# Patient Record
Sex: MALE | Race: WHITE | NOT HISPANIC OR LATINO | Employment: OTHER | URBAN - METROPOLITAN AREA
[De-identification: names, ages, dates, MRNs, and addresses within clinical notes are randomized per-mention and may not be internally consistent; named-entity substitution may affect disease eponyms.]

---

## 2017-01-09 ENCOUNTER — PATIENT MESSAGE (OUTPATIENT)
Dept: HEPATOLOGY | Facility: CLINIC | Age: 62
End: 2017-01-09

## 2017-02-02 ENCOUNTER — PATIENT MESSAGE (OUTPATIENT)
Dept: HEPATOLOGY | Facility: CLINIC | Age: 62
End: 2017-02-02

## 2017-02-06 ENCOUNTER — PATIENT MESSAGE (OUTPATIENT)
Dept: HEPATOLOGY | Facility: CLINIC | Age: 62
End: 2017-02-06

## 2017-02-15 ENCOUNTER — PATIENT MESSAGE (OUTPATIENT)
Dept: HEPATOLOGY | Facility: CLINIC | Age: 62
End: 2017-02-15

## 2017-03-29 ENCOUNTER — PATIENT MESSAGE (OUTPATIENT)
Dept: HEPATOLOGY | Facility: CLINIC | Age: 62
End: 2017-03-29

## 2017-09-05 ENCOUNTER — PATIENT MESSAGE (OUTPATIENT)
Dept: HEPATOLOGY | Facility: CLINIC | Age: 62
End: 2017-09-05

## 2017-10-11 ENCOUNTER — TELEPHONE (OUTPATIENT)
Dept: HEPATOLOGY | Facility: CLINIC | Age: 62
End: 2017-10-11

## 2017-10-11 NOTE — TELEPHONE ENCOUNTER
Call to pto stage 2-3 fibrosis on liver biopsy in 2014. Will send information re Stellar 3 and Nina studies and my cell phone. Pt on vacation until end fo oct.

## 2017-10-30 ENCOUNTER — PATIENT MESSAGE (OUTPATIENT)
Dept: HEPATOLOGY | Facility: CLINIC | Age: 62
End: 2017-10-30

## 2017-10-30 ENCOUNTER — TELEPHONE (OUTPATIENT)
Dept: TRANSPLANT | Facility: CLINIC | Age: 62
End: 2017-10-30

## 2017-11-03 ENCOUNTER — TELEPHONE (OUTPATIENT)
Dept: HEPATOLOGY | Facility: CLINIC | Age: 62
End: 2017-11-03

## 2017-11-03 NOTE — TELEPHONE ENCOUNTER
Called pt re stellar 3. Would like to come in early dec on a Friday. Please call to schedule appt. Will resend consent- states he did not receive.

## 2017-11-06 ENCOUNTER — PATIENT MESSAGE (OUTPATIENT)
Dept: HEPATOLOGY | Facility: CLINIC | Age: 62
End: 2017-11-06

## 2017-12-28 ENCOUNTER — HOSPITAL ENCOUNTER (OUTPATIENT)
Dept: RADIOLOGY | Facility: HOSPITAL | Age: 62
Discharge: HOME | End: 2017-12-28
Attending: NURSE PRACTITIONER
Payer: COMMERCIAL

## 2017-12-28 ENCOUNTER — OFFICE VISIT (OUTPATIENT)
Dept: HEPATOLOGY | Facility: CLINIC | Age: 62
End: 2017-12-28
Payer: COMMERCIAL

## 2017-12-28 VITALS
RESPIRATION RATE: 18 BRPM | BODY MASS INDEX: 35.47 KG/M2 | WEIGHT: 291.25 LBS | HEART RATE: 78 BPM | OXYGEN SATURATION: 100 % | DIASTOLIC BLOOD PRESSURE: 95 MMHG | TEMPERATURE: 97 F | HEIGHT: 76 IN | SYSTOLIC BLOOD PRESSURE: 185 MMHG

## 2017-12-28 DIAGNOSIS — Z00.00 HEALTHCARE MAINTENANCE: ICD-10-CM

## 2017-12-28 DIAGNOSIS — K74.00 LIVER FIBROSIS: ICD-10-CM

## 2017-12-28 DIAGNOSIS — K75.81 NASH (NONALCOHOLIC STEATOHEPATITIS): ICD-10-CM

## 2017-12-28 DIAGNOSIS — D69.3 IDIOPATHIC THROMBOCYTOPENIC PURPURA: ICD-10-CM

## 2017-12-28 DIAGNOSIS — K75.81 NASH (NONALCOHOLIC STEATOHEPATITIS): Primary | ICD-10-CM

## 2017-12-28 PROCEDURE — 76700 US EXAM ABDOM COMPLETE: CPT | Mod: TC

## 2017-12-28 PROCEDURE — 99999 PR PBB SHADOW E&M-EST. PATIENT-LVL V: CPT | Mod: PBBFAC,,, | Performed by: NURSE PRACTITIONER

## 2017-12-28 PROCEDURE — 76700 US EXAM ABDOM COMPLETE: CPT | Mod: 26,,, | Performed by: RADIOLOGY

## 2017-12-28 PROCEDURE — 99214 OFFICE O/P EST MOD 30 MIN: CPT | Mod: S$GLB,,, | Performed by: NURSE PRACTITIONER

## 2017-12-28 RX ORDER — CYANOCOBALAMIN (VITAMIN B-12) 500 MCG
1 TABLET ORAL NIGHTLY
COMMUNITY
End: 2018-12-06

## 2017-12-28 RX ORDER — COLCHICINE 0.6 MG/1
0.6 TABLET ORAL DAILY PRN
COMMUNITY
Start: 2017-11-22 | End: 2019-06-04

## 2017-12-28 NOTE — PROGRESS NOTES
[facility] Established Patient Visit    Reason for Visit:  F/u SHEPPARD    PCP: Edgar Clements    HPI:  This is a 62 y.o. male here for f/u of SHEPPARD. His liver staging has suggested at least moderate fibrosis.    Liver staging history:  - liver biopsy 6/2010 - severe steatohepatitis at 80% w/ stage 1 fibrosis.   - Fibrosure 2014 - cirrhosis, F4   - repeat liver biopsy 7/25/14 - mild steatohepatitis with stage 2-3 out of 4 fibrosis  - Fibroscan 12/2016 - F2/F3, kPa of 10.0     He had labs and u/s today for monitoring. We have recommended labs Q 6 months and u/s yearly. He has stably mildly elevated transaminases, ALT > AST, and mild Tbili elevation at 1.0-1.4. Normal alb and INR. He has a history of ITP, followed by Dr. Marley in the past. Plt count has been stable. Recommendations were to f/u with hematology again if plts < 40. U/s today shows hepatomegaly with steatosis but with some improvement in degree of steatosis. No masses. Spleen 11.7 cm. He has gained about 15 lbs since last year. He is not following healthy diet or exercising.     He reports he feels well and denies any symptoms of advanced chronic liver disease including jaundice, dark urine, hematemesis, melena, slowed mentation, abdominal distention. He is taking vitamin E 800 units daily. He was considering participating in SHEPPARD fibrosis study but has declined because he has reservations about the placebo arm and frequency of visits required since he lives 1.5 hrs away and works a lot.      He requests for me to check his PSA with next labs since he only pays a copay when he does lab at [facility] and with his PCP it costs him several hundred dollars. Will also do lipid panel and Hgb A1C with next lab.       ROS:   GENERAL: Denies fever, chills, weight loss/gain, (+) fatigue  HEENT: Denies headaches, dizziness, vision/hearing changes  CARDIOVASCULAR: Denies chest pain, palpitations, or edema  RESPIRATORY: Denies dyspnea, cough  GI: (+) RUQ abdominal  "pain, no rectal bleeding, nausea, vomiting. No change in bowel pattern or color  : Denies dysuria, hematuria   SKIN: Denies rash, itching   NEURO: Denies confusion, memory loss, or mood changes  PSYCH: Denies depression or anxiety  HEME/LYMPH: Denies easy bruising or bleeding      PMHX:  has a past medical history of HLD (hyperlipidemia); Hypertension; ITP (idiopathic thrombocytopenic purpura); Liver fibrosis (12/19/2016); and SHEPPARD (nonalcoholic steatohepatitis).    PSHX:  has a past surgical history that includes Bone marrow biopsy (8/6/10); Liver biopsy (6/11/10); and Liver biopsy (7/2014).    The patient's social and family histories were reviewed by me and updated in the appropriate section of the electronic medical record.    Review of patient's allergies indicates:   Allergen Reactions    Niacin preparations      Flushed, increased HR       Current Outpatient Rx   Name  Route  Sig  Dispense  Refill    atenolol (TENORMIN) 50 MG tablet    Oral    Take 1 tablet by mouth Daily.              cyanocobalamin (VITAMIN B-12) 1000 MCG tablet    Oral    Take 100 mcg by mouth once daily.              losartan (COZAAR) 100 MG tablet    Oral    Take 100 mg by mouth once daily.              multivitamin (MULTIVITAMIN) per tablet    Oral    Take by mouth Daily.              omega-3 acid ethyl esters (LOVAZA) 1 gram capsule    Oral    Take 2 capsules by mouth Daily.              vitamin E 400 UNIT capsule    Oral    Take 800 Units by mouth once daily.                   Objective Findings:    PHYSICAL EXAM:   Friendly White male, in no acute distress; alert and oriented to person, place and time  VITALS:   BP (!) 185/95 (BP Location: Right arm, Patient Position: Sitting, BP Method: Medium (Automatic))   Pulse 78   Temp 96.5 °F (35.8 °C) (Oral)   Resp 18   Ht 6' 4" (1.93 m)   Wt 132.1 kg (291 lb 3.6 oz)   SpO2 100%   BMI 35.45 kg/m²    HENT: Normocephalic, without obvious abnormality. Oral mucosa pink and moist. " Dentition good.  EYES: Sclerae anicteric.   NECK: Supple.   CARDIOVASCULAR: Regular rate and rhythm. No murmurs.  RESPIRATORY: Normal respiratory effort. BBS CTA. No wheezes or crackles.  GI: Obese, soft, non-tender. Liver edge palpable. No masses palpable. No ascites.  EXTREMITIES:  No clubbing, cyanosis or edema.  SKIN: Warm and dry. No jaundice. No rashes noted to exposed skin. No telangectasias noted. No palmar erythema.  NEURO:  Normal gate. No asterixis.  PSYCH:  Memory intact. Thought and speech pattern appropriate. Behavior normal. No depression or anxiety noted.      Labs:  Lab Results   Component Value Date    WBC 9.13 12/28/2017    HGB 16.1 12/28/2017    HCT 46.9 12/28/2017     (L) 12/28/2017    CHOL 199 01/02/2014    TRIG 227 (H) 01/02/2014    HDL 29 (L) 01/02/2014     12/28/2017    K 4.0 12/28/2017    CREATININE 1.2 12/28/2017    ALT 93 (H) 12/28/2017    AST 63 (H) 12/28/2017    ALKPHOS 57 12/28/2017    BILITOT 1.3 (H) 12/28/2017    ALBUMIN 3.9 12/28/2017    INR 1.0 12/28/2017    AFP 2.1 12/28/2017     ABD U/S 12/28/17  Findings:    Pancreas:  Visualization of the pancreas is obscured due to obscuration by overlying bowel gas.     Liver:  Enlargedwith the right hepatic lobe spanning approximately 21.0 cm and extending below the costal margin. The liver demonstrates homogenously hyperechoic echotexture with an elevated hepatorenal index of 1.8 suggesting greater than 5% hepatic steatosis.  Hepatorenal index has decreased from 2.09 previously suggesting improving hepatic steatosis.  There is a region of fatty sparing adjacent to the gallbladder fossa measuring approximately 1.3 cm.      Gallbladder:  Normal in appearance without cholelithiasis, gallbladder wall thickening, or pericholecystic fluid. Sonographic Majano's signs is negative. The common duct is not dilated, measuring 4.0 mm. No dilated intrahepatic radicles.     Spleen:  At the upper limits of normal in size measuring 11.7 x 4.2  cm with a homogeneous echotexture.    Aorta:  No aneurysm.      Inferior vena cava:  Normal in appearance.    Right kidney:  Measures 11.8cm. No nephrolithiasis or hydronephrosis.    Left kidney:  Measures 10.7cm. There is a stable cyst in the midpole measuring 1.2 x 1.0 x 1.0 cm.      Miscellaneous:  No abdominal ascites.   Impression         1.  Hepatomegaly.    2. Decrease in the hepatorenal index from 2.09 to 1.8 on today's examination suggesting improving hepatic steatosis (although still abnormal)    2.  Left renal cyst.       ASSESSMENT:  62 y.o. White male with:  1.  SHEPPARD   -- transaminases elevated, ALT > AST  -- US- hepatomegaly with fatty infiltration, no splenomegaly   -- synthetic liver function normal, Tbili minimally elevated with nl alb and INR  -- risk factors for fatty liver include obese with BMI 34, HTN, hypertriglyceridemia   -- s/p hepatitis A and B vaccines done with PCP  2. Liver fibrosis  -- liver biopsy 6/2010 - severe steatohepatitis at 80% w/ stage 1 fibrosis.   -- Fibrosure 2014 - cirrhosis, F4   -- repeat liver biopsy 7/25/14 - mild steatohepatitis with stage 2-3 out of 4 fibrosis  -- Fibroscan 12/2016 - F2/F3, kPa of 10.0.   3. ITP, stable   4. Healthcare maintenance  -- check PSA, lipids, hgb A1C with next labs      EDUCATION:   We discussed the manifestations of NAFLD. At this time there is no FDA approved therapy for NAFLD.   The patient and I discussed the importance of diet, exercise, and weight loss for management of NAFLD. We discussed a low fat, low carb/low sugar, high fiber diet, and a goal of 30 minutes of exercise 5 times per week.   Pt is aware that fatty liver can progress to steatohepatitis and possibly to cirrhosis, putting one at increased risk for liver cancer, liver failure, and death.  Discussed weight loss will help to minimize progression of liver disease. May possibly progress to cirrhosis in the future so we will continue to monitor him for this with labs Q 6  months and u/s yearly.      PLAN:  1. Referral to bariatric medicine to help with weight loss  2. Continue Vitamin E 800 IU daily  3. Weight loss goal of 15-20 lbs  4. Low fat, low cholesterol, low carb, high fiber diet  5. Exercise, 5 days a week, 30 min a day  6. Recommend good control of cholesterol  7. Labs for CBC, CMP, INR, lipid panel, Hgb A1C, PSA in 6/2018 at [place]  8. Follow up in 12/2018 with Fibroscan same day and lab for CMP, CBC, AFP, and INR and abd u/s to be done at [place] before appt    Thank you for allowing me to participate in the care of Danny LinderNick Hodges, TRISHC

## 2018-02-08 ENCOUNTER — TELEPHONE (OUTPATIENT)
Dept: HEPATOLOGY | Facility: CLINIC | Age: 63
End: 2018-02-08

## 2018-02-08 ENCOUNTER — PATIENT MESSAGE (OUTPATIENT)
Dept: HEPATOLOGY | Facility: CLINIC | Age: 63
End: 2018-02-08

## 2018-02-08 NOTE — TELEPHONE ENCOUNTER
Contacted patient re Stellar 3 trial. Patient is interested but would like to look over the consent form this weekend. Consent form emailed. I will touch base with him again next Wednesday after the holiday. Emphasized the time sensitive nature of the trial closing to enrollment soon. Patient stated understanding.

## 2018-04-16 ENCOUNTER — PATIENT MESSAGE (OUTPATIENT)
Dept: HEPATOLOGY | Facility: CLINIC | Age: 63
End: 2018-04-16

## 2018-06-29 ENCOUNTER — LAB VISIT (OUTPATIENT)
Dept: LAB | Facility: HOSPITAL | Age: 63
End: 2018-06-29
Attending: NURSE PRACTITIONER
Payer: COMMERCIAL

## 2018-06-29 DIAGNOSIS — Z00.00 HEALTHCARE MAINTENANCE: ICD-10-CM

## 2018-06-29 DIAGNOSIS — K74.00 LIVER FIBROSIS: ICD-10-CM

## 2018-06-29 DIAGNOSIS — K75.81 NASH (NONALCOHOLIC STEATOHEPATITIS): ICD-10-CM

## 2018-06-29 DIAGNOSIS — K74.60 HEPATIC CIRRHOSIS, UNSPECIFIED HEPATIC CIRRHOSIS TYPE: ICD-10-CM

## 2018-06-29 LAB
ALBUMIN SERPL BCP-MCNC: 4 G/DL
ALP SERPL-CCNC: 52 U/L
ALT SERPL W/O P-5'-P-CCNC: 100 U/L
ANION GAP SERPL CALC-SCNC: 9 MMOL/L
AST SERPL-CCNC: 68 U/L
BASOPHILS # BLD AUTO: 0.09 K/UL
BASOPHILS NFR BLD: 1.1 %
BILIRUB SERPL-MCNC: 1 MG/DL
BUN SERPL-MCNC: 16 MG/DL
CALCIUM SERPL-MCNC: 10.2 MG/DL
CHLORIDE SERPL-SCNC: 108 MMOL/L
CHOLEST SERPL-MCNC: 194 MG/DL
CHOLEST/HDLC SERPL: 6.7 {RATIO}
CO2 SERPL-SCNC: 27 MMOL/L
COMPLEXED PSA SERPL-MCNC: 2.8 NG/ML
CREAT SERPL-MCNC: 1.2 MG/DL
DIFFERENTIAL METHOD: ABNORMAL
EOSINOPHIL # BLD AUTO: 0.2 K/UL
EOSINOPHIL NFR BLD: 2.5 %
ERYTHROCYTE [DISTWIDTH] IN BLOOD BY AUTOMATED COUNT: 13.2 %
EST. GFR  (AFRICAN AMERICAN): >60 ML/MIN/1.73 M^2
EST. GFR  (NON AFRICAN AMERICAN): >60 ML/MIN/1.73 M^2
ESTIMATED AVG GLUCOSE: 108 MG/DL
GLUCOSE SERPL-MCNC: 116 MG/DL
HBA1C MFR BLD HPLC: 5.4 %
HCT VFR BLD AUTO: 46.9 %
HDLC SERPL-MCNC: 29 MG/DL
HDLC SERPL: 14.9 %
HGB BLD-MCNC: 15.9 G/DL
INR PPP: 1.1
LDLC SERPL CALC-MCNC: 130 MG/DL
LYMPHOCYTES # BLD AUTO: 2.8 K/UL
LYMPHOCYTES NFR BLD: 33.2 %
MCH RBC QN AUTO: 33.1 PG
MCHC RBC AUTO-ENTMCNC: 33.9 G/DL
MCV RBC AUTO: 98 FL
MONOCYTES # BLD AUTO: 1 K/UL
MONOCYTES NFR BLD: 11.4 %
NEUTROPHILS # BLD AUTO: 4.4 K/UL
NEUTROPHILS NFR BLD: 51.8 %
NONHDLC SERPL-MCNC: 165 MG/DL
PLATELET # BLD AUTO: 96 K/UL
PMV BLD AUTO: 10.5 FL
POTASSIUM SERPL-SCNC: 5 MMOL/L
PROT SERPL-MCNC: 8 G/DL
PROTHROMBIN TIME: 11.1 SEC
RBC # BLD AUTO: 4.81 M/UL
SODIUM SERPL-SCNC: 144 MMOL/L
TRIGL SERPL-MCNC: 175 MG/DL
WBC # BLD AUTO: 8.56 K/UL

## 2018-06-29 PROCEDURE — 36415 COLL VENOUS BLD VENIPUNCTURE: CPT

## 2018-06-29 PROCEDURE — 84153 ASSAY OF PSA TOTAL: CPT

## 2018-06-29 PROCEDURE — 80061 LIPID PANEL: CPT

## 2018-06-29 PROCEDURE — 83036 HEMOGLOBIN GLYCOSYLATED A1C: CPT

## 2018-06-29 PROCEDURE — 85610 PROTHROMBIN TIME: CPT

## 2018-06-29 PROCEDURE — 85025 COMPLETE CBC W/AUTO DIFF WBC: CPT

## 2018-06-29 PROCEDURE — 80053 COMPREHEN METABOLIC PANEL: CPT

## 2018-07-02 ENCOUNTER — PATIENT MESSAGE (OUTPATIENT)
Dept: HEPATOLOGY | Facility: CLINIC | Age: 63
End: 2018-07-02

## 2018-08-08 ENCOUNTER — PATIENT MESSAGE (OUTPATIENT)
Dept: HEPATOLOGY | Facility: CLINIC | Age: 63
End: 2018-08-08

## 2018-08-11 ENCOUNTER — PATIENT MESSAGE (OUTPATIENT)
Dept: HEPATOLOGY | Facility: CLINIC | Age: 63
End: 2018-08-11

## 2018-10-13 ENCOUNTER — PATIENT MESSAGE (OUTPATIENT)
Dept: HEPATOLOGY | Facility: CLINIC | Age: 63
End: 2018-10-13

## 2018-11-30 ENCOUNTER — HOSPITAL ENCOUNTER (OUTPATIENT)
Dept: RADIOLOGY | Facility: HOSPITAL | Age: 63
Discharge: HOME | End: 2018-11-30
Attending: NURSE PRACTITIONER
Payer: COMMERCIAL

## 2018-11-30 DIAGNOSIS — K75.81 NASH (NONALCOHOLIC STEATOHEPATITIS): ICD-10-CM

## 2018-11-30 DIAGNOSIS — K74.00 LIVER FIBROSIS: ICD-10-CM

## 2018-11-30 PROCEDURE — 76700 US EXAM ABDOM COMPLETE: CPT | Mod: TC

## 2018-11-30 PROCEDURE — 76700 US EXAM ABDOM COMPLETE: CPT | Mod: 26,,, | Performed by: RADIOLOGY

## 2018-12-06 ENCOUNTER — OFFICE VISIT (OUTPATIENT)
Dept: HEPATOLOGY | Facility: CLINIC | Age: 63
End: 2018-12-06
Payer: COMMERCIAL

## 2018-12-06 ENCOUNTER — PROCEDURE VISIT (OUTPATIENT)
Dept: HEPATOLOGY | Facility: CLINIC | Age: 63
End: 2018-12-06
Attending: NURSE PRACTITIONER
Payer: COMMERCIAL

## 2018-12-06 VITALS
OXYGEN SATURATION: 100 % | HEIGHT: 76 IN | HEART RATE: 75 BPM | SYSTOLIC BLOOD PRESSURE: 175 MMHG | RESPIRATION RATE: 18 BRPM | BODY MASS INDEX: 35.17 KG/M2 | WEIGHT: 288.81 LBS | DIASTOLIC BLOOD PRESSURE: 87 MMHG

## 2018-12-06 DIAGNOSIS — K74.00 LIVER FIBROSIS: ICD-10-CM

## 2018-12-06 DIAGNOSIS — Z12.11 COLON CANCER SCREENING: ICD-10-CM

## 2018-12-06 DIAGNOSIS — D69.3 IDIOPATHIC THROMBOCYTOPENIC PURPURA: ICD-10-CM

## 2018-12-06 DIAGNOSIS — E66.09 CLASS 2 OBESITY DUE TO EXCESS CALORIES WITHOUT SERIOUS COMORBIDITY WITH BODY MASS INDEX (BMI) OF 35.0 TO 35.9 IN ADULT: ICD-10-CM

## 2018-12-06 DIAGNOSIS — I10 HYPERTENSION, UNSPECIFIED TYPE: ICD-10-CM

## 2018-12-06 DIAGNOSIS — K75.81 NASH (NONALCOHOLIC STEATOHEPATITIS): ICD-10-CM

## 2018-12-06 DIAGNOSIS — K75.81 NASH (NONALCOHOLIC STEATOHEPATITIS): Primary | ICD-10-CM

## 2018-12-06 DIAGNOSIS — K74.60 CIRRHOSIS OF LIVER WITHOUT ASCITES, UNSPECIFIED HEPATIC CIRRHOSIS TYPE: ICD-10-CM

## 2018-12-06 PROBLEM — E66.812 CLASS 2 OBESITY DUE TO EXCESS CALORIES WITHOUT SERIOUS COMORBIDITY WITH BODY MASS INDEX (BMI) OF 35.0 TO 35.9 IN ADULT: Status: ACTIVE | Noted: 2018-12-06

## 2018-12-06 PROCEDURE — 3008F BODY MASS INDEX DOCD: CPT | Mod: CPTII,S$GLB,, | Performed by: NURSE PRACTITIONER

## 2018-12-06 PROCEDURE — 99999 PR PBB SHADOW E&M-EST. PATIENT-LVL IV: CPT | Mod: PBBFAC,,, | Performed by: NURSE PRACTITIONER

## 2018-12-06 PROCEDURE — 3077F SYST BP >= 140 MM HG: CPT | Mod: CPTII,S$GLB,, | Performed by: NURSE PRACTITIONER

## 2018-12-06 PROCEDURE — 99214 OFFICE O/P EST MOD 30 MIN: CPT | Mod: S$GLB,,, | Performed by: NURSE PRACTITIONER

## 2018-12-06 PROCEDURE — 91200 LIVER ELASTOGRAPHY: CPT | Mod: S$GLB,,, | Performed by: NURSE PRACTITIONER

## 2018-12-06 PROCEDURE — 3079F DIAST BP 80-89 MM HG: CPT | Mod: CPTII,S$GLB,, | Performed by: NURSE PRACTITIONER

## 2018-12-06 NOTE — PROGRESS NOTES
[facility] Established Patient Visit    Reason for Visit:  F/u SHEPPARD    PCP: Edgar Clements    HPI:  This is a 63 y.o. male here for f/u of SHEPPARD. His liver staging has suggested at least moderate fibrosis.    Liver staging history:  - liver biopsy 6/2010 - severe steatohepatitis at 80% w/ stage 1 fibrosis.   - Fibrosure 2014 - cirrhosis, F4   - repeat liver biopsy 7/25/14 - mild steatohepatitis with stage 2-3 out of 4 fibrosis  - Fibroscan 12/2016 - F2/F3, kPa of 10.0  - Fibroscan today - kPa 14.6, F4 cirrhosis, CAP = 341, S3     He had labs and u/s recently for monitoring. He has stably mildly elevated transaminases, ALT > AST, and mild Tbili elevation at 1.0-1.4. Normal alb and INR. He has a history of ITP, followed by Dr. Marley in the past. Plt count has been stable. Recommendations were to f/u with hematology again if plts < 40. U/s shows hepatomegaly with steatosis. No masses. Spleen 10.8 cm. He has lost 3 lbs since last year. He is trying to follow healthy diet. He did not see bariatric medicine because it wasn't covered by insurance.     He reports he feels well and denies any symptoms of advanced chronic liver disease including jaundice, dark urine, hematemesis, melena, slowed mentation, abdominal distention. He is taking vitamin E 800 units daily. He was considering participating in SHEPPARD fibrosis study but has declined in past. Will not reconsider since Fibroscan today now shows cirrhosis.        ROS:   GENERAL: Denies fever, chills, weight loss/gain, fatigue  HEENT: Denies headaches, dizziness, vision/hearing changes  CARDIOVASCULAR: Denies chest pain, palpitations, or edema  RESPIRATORY: Denies dyspnea, cough  GI: Denies abdominal pain, rectal bleeding, nausea, vomiting. No change in bowel pattern or color  : Denies dysuria, hematuria   SKIN: Denies rash, itching   NEURO: Denies confusion, memory loss, or mood changes  PSYCH: Denies depression or anxiety  HEME/LYMPH: Denies easy bruising or  "bleeding      PMHX:  has a past medical history of HLD (hyperlipidemia), Hypertension, ITP (idiopathic thrombocytopenic purpura), Liver fibrosis (12/19/2016), and SHEPPARD (nonalcoholic steatohepatitis).    PSHX:  has a past surgical history that includes Bone marrow biopsy (8/6/10); Liver biopsy (6/11/10); Liver biopsy (7/2014); and BIOPSY LIVER AND ULTRASOUND (N/A, 7/25/2014).    The patient's social and family histories were reviewed by me and updated in the appropriate section of the electronic medical record.    Review of patient's allergies indicates:   Allergen Reactions    Niacin preparations      Flushed, increased HR       Current Outpatient Medications on File Prior to Visit   Medication Sig Dispense Refill    atenolol (TENORMIN) 50 MG tablet Take 1 tablet by mouth Daily.      colchicine 0.6 mg tablet Take 0.6 mg by mouth daily as needed.       losartan (COZAAR) 100 MG tablet Take 100 mg by mouth once daily.      milk thistle 175 mg tablet Take 175 mg by mouth 2 (two) times daily.      multivitamin (MULTIVITAMIN) per tablet Take by mouth Daily.      omega-3 acid ethyl esters (LOVAZA) 1 gram capsule Take 2 capsules by mouth Daily.      vitamin E 400 UNIT capsule Take 800 Units by mouth once daily.      [DISCONTINUED] melatonin 1 mg Tab Take 1 mg by mouth every evening.       No current facility-administered medications on file prior to visit.         Objective Findings:    PHYSICAL EXAM:   Friendly White male, in no acute distress; alert and oriented to person, place and time  VITALS:   BP (!) 175/87 (BP Location: Left arm, Patient Position: Sitting)   Pulse 75   Resp 18   Ht 6' 4" (1.93 m)   Wt 131 kg (288 lb 12.8 oz)   SpO2 100%   BMI 35.15 kg/m²    HENT: Normocephalic, without obvious abnormality. Oral mucosa pink and moist. Dentition good.  EYES: Sclerae anicteric.   NECK: Supple.   CARDIOVASCULAR: Regular rate and rhythm. No murmurs.  RESPIRATORY: Normal respiratory effort. BBS CTA. No " wheezes or crackles.  GI: Obese, soft, non-tender. Liver edge palpable. No masses palpable. No ascites.  EXTREMITIES:  No clubbing, cyanosis or edema.  SKIN: Warm and dry. No jaundice. No rashes noted to exposed skin. No telangectasias noted. No palmar erythema.  NEURO:  Normal gate. No asterixis.  PSYCH:  Memory intact. Thought and speech pattern appropriate. Behavior normal. No depression or anxiety noted.      Labs:  Lab Results   Component Value Date    WBC 8.25 11/30/2018    HGB 16.0 11/30/2018    HCT 47.0 11/30/2018    PLT 91 (L) 11/30/2018    CHOL 194 06/29/2018    TRIG 175 (H) 06/29/2018    HDL 29 (L) 06/29/2018     11/30/2018    K 4.7 11/30/2018    CREATININE 1.2 11/30/2018    ALT 57 (H) 11/30/2018    AST 37 11/30/2018    ALKPHOS 46 (L) 11/30/2018    BILITOT 1.2 (H) 11/30/2018    ALBUMIN 4.0 11/30/2018    INR 1.1 11/30/2018    AFP 2.0 11/30/2018     ABD U/S 11/30/18  FINDINGS:  Pancreas: The visualized portions of pancreas appear normal.    Aorta: No aneurysm.    Liver: 19.7 cm, normal in size. Fatty liver infiltration.  No focal lesions.    Gallbladder: No calculi, wall thickening, or pericholecystic fluid.  Negative sonographic Majano's sign.    Biliary system: 5.5 mm common bile duct.  No intrahepatic ductal dilatation.    Inferior vena cava: Normal in appearance.    Right kidney: 11.5 cm. Midpole cyst measures 1.4 cm.    Left kidney: 11.6 cm. Two small cysts are noted, the largest measuring 1.2 cm.    Spleen: 10.8 cm.  Normal in size with homogeneous echotexture.    Miscellaneous: No ascites.      Impression       Hepatomegaly with Fatty infiltration of the liver.    Bilateral renal cysts.       ASSESSMENT:  63 y.o. White male with:  1.  SHEPPARD   -- transaminases elevated, ALT > AST  -- US- hepatomegaly with fatty infiltration, no splenomegaly   -- synthetic liver function normal, Tbili minimally elevated with nl alb and INR  -- risk factors for fatty liver include obese with BMI 35, HTN,  hypertriglyceridemia   2. Cirrhosis, based on Fibroscan = F4 today, well compensated  -- MELD = MELD-Na score: 10 at 11/30/2018  9:17 AM  MELD score: 10 at 11/30/2018  9:17 AM  Calculated from:  Serum Creatinine: 1.2 mg/dL at 11/30/2018  9:17 AM  Serum Sodium: 142 mmol/L (Rounded to 137 mmol/L) at 11/30/2018  9:17 AM  Total Bilirubin: 1.2 mg/dL at 11/30/2018  9:17 AM  INR(ratio): 1.1 at 11/30/2018  9:17 AM  Age: 63 years  -- HCC screening- AFP and abd. U/S - up to date, next due 6/2019  -- Immunity to Hep A and B - s/p hepatitis A and B vaccines done with PCP  -- EGD - none  -- liver biopsy 6/2010 - severe steatohepatitis at 80% w/ stage 1 fibrosis.   -- Fibrosure 2014 - cirrhosis, F4   -- repeat liver biopsy 7/25/14 - mild steatohepatitis with stage 2-3 out of 4 fibrosis  -- Fibroscan 12/2016 - F2/F3, kPa of 10.0  -- Fibroscan today - kPa 14.6, F4 cirrhosis, CAP = 341, S3    3. ITP, stable   4. Colon cancer screening  -- has never had colonoscopy. Since he will need EGD for varices screening, will do colonoscopy as well    EDUCATION:   We discussed the manifestations of NAFLD. At this time there is no FDA approved therapy for NAFLD.   The patient and I discussed the importance of diet, exercise, and weight loss for management of NAFLD. We discussed a low fat, low carb/low sugar, high fiber diet, and a goal of 30 minutes of exercise 5 times per week.   Pt is aware that fatty liver can progress to steatohepatitis and possibly to cirrhosis, putting one at increased risk for liver cancer, liver failure, and death.  Discussed weight loss will help to minimize progression of liver disease.     The disease process and manifestations of cirrhosis were discussed.    Signs and symptoms of hepatic decompensation were reviewed, including jaundice, ascites, and slowed mentation due to hepatic encephalopathy. The patient should seek medical attention if any of these things occur.  We discussed the potential for bleeding from  esophageal varices with symptoms of hematemesis and melena. The patient should report to the Emergency Department for these symptoms.    We discussed the increased risk of hepatocellular carcinoma due to cirrhosis. Continued screening every six months with ultrasound and AFP is recommended.     No alcohol or raw seafood.  Tylenol/acetaminophen as needed for pain, up to 2000 mg daily    Discussed implications of a cirrhosis diagnosis with HCC screenings and EGD and why it is important for us to determine if pt's have cirrhosis so they can be properly monitored for potential complications.         PLAN:  1. HCC screening Q 6 months with AFP and abd. U/S, next due 6/2019  2. Continue Vitamin E 800 IU daily  3. Weight loss goal of 15-20 lbs  4. Low fat, low cholesterol, low carb, high fiber diet  5. Exercise, 5 days a week, 30 min a day  6. Recommend good control of cholesterol  7. Tylenol/acetaminophen as needed for pain, up to 2000 mg daily. No NSAIDS  8. No alcohol or raw seafood  9. Discussed SHEPPARD fibrosis/cirrhosis clinical trials that are currently enrolling. He is interested in this again since he now has cirrhosis. Will re-submit him  10. Follow up in 6/2019 with lab for CMP, CBC, AFP, and INR and abd u/s to be done at [place] before appt    Thank you for allowing me to participate in the care of ARGENTINA Lewis

## 2018-12-06 NOTE — Clinical Note
Please enter recall for Follow up in 6/2019 with lab for CMP, CBC, AFP, and INR and abd u/s to be done at [place] before appt

## 2018-12-06 NOTE — PROCEDURES
Fibroscan Procedure     Name: Danny Jack  Date of Procedure : 2018   :: Winsome Hodges NP  Diagnosis: NAFLD    Probe: XL    Fibroscan readin.6 KPa    Fibrosis:F4     CAP readin dB/m    Steatosis: :S3

## 2018-12-11 DIAGNOSIS — Z12.11 SPECIAL SCREENING FOR MALIGNANT NEOPLASMS, COLON: Primary | ICD-10-CM

## 2018-12-11 DIAGNOSIS — K74.60 HEPATIC CIRRHOSIS, UNSPECIFIED HEPATIC CIRRHOSIS TYPE, UNSPECIFIED WHETHER ASCITES PRESENT: Primary | ICD-10-CM

## 2018-12-11 RX ORDER — POLYETHYLENE GLYCOL 3350, SODIUM SULFATE ANHYDROUS, SODIUM BICARBONATE, SODIUM CHLORIDE, POTASSIUM CHLORIDE 236; 22.74; 6.74; 5.86; 2.97 G/4L; G/4L; G/4L; G/4L; G/4L
4 POWDER, FOR SOLUTION ORAL ONCE
Qty: 4000 ML | Refills: 0 | Status: SHIPPED | OUTPATIENT
Start: 2018-12-11 | End: 2018-12-11

## 2019-01-11 ENCOUNTER — PATIENT MESSAGE (OUTPATIENT)
Dept: ADMINISTRATIVE | Facility: OTHER | Age: 64
End: 2019-01-11

## 2019-03-27 ENCOUNTER — PATIENT MESSAGE (OUTPATIENT)
Dept: HEPATOLOGY | Facility: CLINIC | Age: 64
End: 2019-03-27

## 2019-04-17 ENCOUNTER — TELEPHONE (OUTPATIENT)
Dept: HEPATOLOGY | Facility: CLINIC | Age: 64
End: 2019-04-17

## 2019-04-17 DIAGNOSIS — K74.60 HEPATIC CIRRHOSIS, UNSPECIFIED HEPATIC CIRRHOSIS TYPE: Primary | ICD-10-CM

## 2019-04-17 NOTE — TELEPHONE ENCOUNTER
Verified appointment scheduling with patient, approved of appointments that I set for him for June at [facility] and @ [facility]. Mailed appointment verifications to home address

## 2019-05-28 ENCOUNTER — PATIENT MESSAGE (OUTPATIENT)
Dept: HEPATOLOGY | Facility: CLINIC | Age: 64
End: 2019-05-28

## 2019-05-28 DIAGNOSIS — Z00.00 HEALTHCARE MAINTENANCE: Primary | ICD-10-CM

## 2019-06-04 ENCOUNTER — OFFICE VISIT (OUTPATIENT)
Dept: HEPATOLOGY | Facility: CLINIC | Age: 64
End: 2019-06-04
Payer: MEDICARE

## 2019-06-04 ENCOUNTER — HOSPITAL ENCOUNTER (OUTPATIENT)
Dept: RADIOLOGY | Facility: HOSPITAL | Age: 64
Discharge: HOME | End: 2019-06-04
Attending: NURSE PRACTITIONER
Payer: MEDICARE

## 2019-06-04 ENCOUNTER — PATIENT MESSAGE (OUTPATIENT)
Dept: HEPATOLOGY | Facility: CLINIC | Age: 64
End: 2019-06-04

## 2019-06-04 VITALS
BODY MASS INDEX: 34.1 KG/M2 | HEIGHT: 76 IN | HEART RATE: 77 BPM | SYSTOLIC BLOOD PRESSURE: 167 MMHG | RESPIRATION RATE: 18 BRPM | OXYGEN SATURATION: 99 % | DIASTOLIC BLOOD PRESSURE: 108 MMHG | WEIGHT: 280 LBS

## 2019-06-04 DIAGNOSIS — E66.09 CLASS 2 OBESITY DUE TO EXCESS CALORIES WITHOUT SERIOUS COMORBIDITY WITH BODY MASS INDEX (BMI) OF 35.0 TO 35.9 IN ADULT: ICD-10-CM

## 2019-06-04 DIAGNOSIS — K75.81 NASH (NONALCOHOLIC STEATOHEPATITIS): ICD-10-CM

## 2019-06-04 DIAGNOSIS — D69.3 IDIOPATHIC THROMBOCYTOPENIC PURPURA: ICD-10-CM

## 2019-06-04 DIAGNOSIS — I10 HYPERTENSION, UNSPECIFIED TYPE: ICD-10-CM

## 2019-06-04 DIAGNOSIS — K74.60 CIRRHOSIS OF LIVER WITHOUT ASCITES, UNSPECIFIED HEPATIC CIRRHOSIS TYPE: ICD-10-CM

## 2019-06-04 DIAGNOSIS — Z12.11 COLON CANCER SCREENING: ICD-10-CM

## 2019-06-04 DIAGNOSIS — K74.60 HEPATIC CIRRHOSIS, UNSPECIFIED HEPATIC CIRRHOSIS TYPE: Primary | ICD-10-CM

## 2019-06-04 DIAGNOSIS — K74.60 HEPATIC CIRRHOSIS, UNSPECIFIED HEPATIC CIRRHOSIS TYPE: ICD-10-CM

## 2019-06-04 PROCEDURE — 99999 PR PBB SHADOW E&M-EST. PATIENT-LVL IV: ICD-10-PCS | Mod: PBBFAC,,, | Performed by: NURSE PRACTITIONER

## 2019-06-04 PROCEDURE — 99214 OFFICE O/P EST MOD 30 MIN: CPT | Mod: PBBFAC,25 | Performed by: NURSE PRACTITIONER

## 2019-06-04 PROCEDURE — 76700 US EXAM ABDOM COMPLETE: CPT | Mod: TC

## 2019-06-04 PROCEDURE — 99999 PR PBB SHADOW E&M-EST. PATIENT-LVL IV: CPT | Mod: PBBFAC,,, | Performed by: NURSE PRACTITIONER

## 2019-06-04 PROCEDURE — 99214 PR OFFICE/OUTPT VISIT, EST, LEVL IV, 30-39 MIN: ICD-10-PCS | Mod: S$PBB,,, | Performed by: NURSE PRACTITIONER

## 2019-06-04 PROCEDURE — 99214 OFFICE O/P EST MOD 30 MIN: CPT | Mod: S$PBB,,, | Performed by: NURSE PRACTITIONER

## 2019-06-04 RX ORDER — ATORVASTATIN CALCIUM 10 MG/1
TABLET, FILM COATED ORAL
COMMUNITY

## 2019-10-03 ENCOUNTER — TELEPHONE (OUTPATIENT)
Dept: HEPATOLOGY | Facility: CLINIC | Age: 64
End: 2019-10-03

## 2019-10-03 NOTE — TELEPHONE ENCOUNTER
Attempted to contact patient and got no response. I sent a message to the patient on [employer], left a voicemail, and sent a reminder letter in the mail informing him of the changes.

## 2019-11-29 ENCOUNTER — TELEPHONE (OUTPATIENT)
Dept: HEPATOLOGY | Facility: CLINIC | Age: 64
End: 2019-11-29

## 2019-11-29 NOTE — TELEPHONE ENCOUNTER
Contacted patient and scheduled his appointment for a different time and date as requested by patient. Patient also asked for me to ask Ms. Scarlett Goldman NP if she can order a PSA blood work for him. Patient states that Ms. Modia would order it for him, I informed patient that I would ask Ms. Pantoja and see if she would like to order it. Awaiting an answer from Ms. Pantoja.

## 2019-11-29 NOTE — TELEPHONE ENCOUNTER
----- Message from Donna Quinn sent at 11/29/2019 11:15 AM CST -----  [facility]  Patient would like to cancel appointment scheduled on 12/5/19.

## 2019-12-02 NOTE — TELEPHONE ENCOUNTER
Contacted patient and informed him that he will need to contact primary care physician to order the test he would like to have. Patient stated that he don't understand why Ms. Pantoja can't order the test because Ms. Schumacher would order it for it. I informed patient that this is a test that Ms. Pantoja do not need to order for hepatology standpoint and he will have to consult with his PCP if he would like to have the test. Patient stated Okay.

## 2020-01-31 ENCOUNTER — HOSPITAL ENCOUNTER (OUTPATIENT)
Dept: RADIOLOGY | Facility: HOSPITAL | Age: 65
Discharge: HOME | End: 2020-01-31
Attending: NURSE PRACTITIONER
Payer: MEDICARE

## 2020-01-31 DIAGNOSIS — K74.60 HEPATIC CIRRHOSIS, UNSPECIFIED HEPATIC CIRRHOSIS TYPE: ICD-10-CM

## 2020-01-31 PROCEDURE — 76700 US EXAM ABDOM COMPLETE: CPT | Mod: 26,,, | Performed by: RADIOLOGY

## 2020-01-31 PROCEDURE — 76700 US ABDOMEN COMPLETE: ICD-10-PCS | Mod: 26,,, | Performed by: RADIOLOGY

## 2020-01-31 PROCEDURE — 76700 US EXAM ABDOM COMPLETE: CPT | Mod: TC

## 2020-10-26 ENCOUNTER — PATIENT MESSAGE (OUTPATIENT)
Dept: ADMINISTRATIVE | Facility: OTHER | Age: 65
End: 2020-10-26

## 2020-10-27 ENCOUNTER — TELEPHONE (OUTPATIENT)
Dept: HEPATOLOGY | Facility: CLINIC | Age: 65
End: 2020-10-27

## 2020-10-27 DIAGNOSIS — K74.60 HEPATIC CIRRHOSIS, UNSPECIFIED HEPATIC CIRRHOSIS TYPE: Primary | ICD-10-CM

## 2020-10-27 NOTE — TELEPHONE ENCOUNTER
Patient overdue for cirrhosis monitoring and clinic visit.    Please schedule labs (CBC, CMP, INR, AFP), US, and f/u visit with any JUANCARLOS (previously followed by Winsome). Thanks!

## 2020-11-05 ENCOUNTER — HOSPITAL ENCOUNTER (OUTPATIENT)
Dept: RADIOLOGY | Facility: HOSPITAL | Age: 65
Discharge: HOME | End: 2020-11-05
Attending: NURSE PRACTITIONER
Payer: MEDICARE

## 2020-11-05 DIAGNOSIS — K74.60 HEPATIC CIRRHOSIS, UNSPECIFIED HEPATIC CIRRHOSIS TYPE: ICD-10-CM

## 2020-11-05 PROCEDURE — 76705 ECHO EXAM OF ABDOMEN: CPT | Mod: 26,,, | Performed by: RADIOLOGY

## 2020-11-05 PROCEDURE — 76705 ECHO EXAM OF ABDOMEN: CPT | Mod: TC

## 2020-11-05 PROCEDURE — 76705 US ABDOMEN LIMITED: ICD-10-PCS | Mod: 26,,, | Performed by: RADIOLOGY

## 2020-11-09 ENCOUNTER — TELEPHONE (OUTPATIENT)
Dept: HEPATOLOGY | Facility: CLINIC | Age: 65
End: 2020-11-09

## 2020-11-09 NOTE — TELEPHONE ENCOUNTER
MELD-Na score: 11 at 11/5/2020 11:09 AM  MELD score: 11 at 11/5/2020 11:09 AM  Calculated from:  Serum Creatinine: 1.3 mg/dL at 11/5/2020 11:09 AM  Serum Sodium: 144 mmol/L (Rounded to 137 mmol/L) at 11/5/2020 11:09 AM  Total Bilirubin: 1.2 mg/dL at 11/5/2020 11:09 AM  INR(ratio): 1.1 at 11/5/2020 11:09 AM  Age: 65 years 6 months

## 2020-11-12 ENCOUNTER — OFFICE VISIT (OUTPATIENT)
Dept: HEPATOLOGY | Facility: CLINIC | Age: 65
End: 2020-11-12
Payer: MEDICARE

## 2020-11-12 ENCOUNTER — TELEPHONE (OUTPATIENT)
Dept: HEPATOLOGY | Facility: CLINIC | Age: 65
End: 2020-11-12

## 2020-11-12 DIAGNOSIS — G47.00 INSOMNIA, UNSPECIFIED TYPE: ICD-10-CM

## 2020-11-12 DIAGNOSIS — K74.60 CIRRHOSIS OF LIVER WITHOUT ASCITES, UNSPECIFIED HEPATIC CIRRHOSIS TYPE: Primary | ICD-10-CM

## 2020-11-12 DIAGNOSIS — I10 HYPERTENSION, UNSPECIFIED TYPE: ICD-10-CM

## 2020-11-12 DIAGNOSIS — E66.09 CLASS 2 OBESITY DUE TO EXCESS CALORIES WITHOUT SERIOUS COMORBIDITY WITH BODY MASS INDEX (BMI) OF 35.0 TO 35.9 IN ADULT: ICD-10-CM

## 2020-11-12 DIAGNOSIS — K75.81 NASH (NONALCOHOLIC STEATOHEPATITIS): ICD-10-CM

## 2020-11-12 DIAGNOSIS — E78.5 HYPERLIPIDEMIA, UNSPECIFIED HYPERLIPIDEMIA TYPE: ICD-10-CM

## 2020-11-12 DIAGNOSIS — D69.6 THROMBOCYTOPENIA: ICD-10-CM

## 2020-11-12 PROBLEM — M10.9 GOUT OF ANKLE: Status: ACTIVE | Noted: 2020-11-12

## 2020-11-12 PROCEDURE — 99214 PR OFFICE/OUTPT VISIT, EST, LEVL IV, 30-39 MIN: ICD-10-PCS | Mod: 95,,, | Performed by: PHYSICIAN ASSISTANT

## 2020-11-12 PROCEDURE — 99214 OFFICE O/P EST MOD 30 MIN: CPT | Mod: 95,,, | Performed by: PHYSICIAN ASSISTANT

## 2020-11-12 RX ORDER — ALPRAZOLAM 0.5 MG/1
TABLET ORAL
COMMUNITY
Start: 2020-11-05

## 2020-11-12 RX ORDER — COLCHICINE 0.6 MG/1
TABLET ORAL
COMMUNITY
End: 2021-12-06

## 2020-11-12 NOTE — Clinical Note
Please call pt schedule patient for ultrasound , labs (at preferred location) and f/u visit with me a few days after, in 6 mos. Give patient number for endoscopy scheduling.

## 2020-11-12 NOTE — PATIENT INSTRUCTIONS
1. Liver function is stable from last time. MELD is 11  2. No concerning liver masses on ultrasound and AFP is normal. Continue monitoring for liver cancer every 6 months.  3. Recommend EGD for screening of enlarged esophageal blood vessels, recommend colonoscopy for colon cancer screening. Please call [phone] to schedule this.  4. Minimize use of xanax for sleeping, recommend melatonin.   5. Minimize sugary drinks and packaged foods. Replace with bubbly lambert, whole fruits and vegetables.   6. Recommend daily walking for 20-30 minutes. Recommend exercise whenever possible.  7. Cirrhosis information as below  8. Follow-up in 6 months with ultrasound, labs, and clinic visit.       CIRRHOSIS:  Because you have cirrhosis, it is important to attend clinic visits every 6 months with an Ultrasound and blood tests every 6 months to screen for liver cancer (you are at risk of developing liver cancer due to scar tissue in the liver)    Signs and symptoms of worsening liver disease include jaundice, fluid in the belly (ascites), and confusion/disorientation/slowed thought processes due to hepatic encephalopathy (toxins building up because of liver problems).   You should seek medical attention if any of these things occur.    Also, possible bleeding from esophageal varices (blood vessels in the stomach and foodpipe can burst and cause fatal bleeding).  Therefore, if you have symptoms of vomiting blood, blood in your stool, dark or black stools or vomiting coffee ground vomit, YOU SHOULD GO TO THE EMERGENCY ROOM IMMEDIATELY.     Cirrhosis can increase the risk of liver cancer, liver failure, and death. However, we will watch your liver function score (MELD score) closely with each clinic visit. A normal MELD score is 6, highest is 40. Your last one was an 11. We will check this with every clinic visit. A MELD 15 or higher is when we start to consider transplant because MELD 15 or higher indicates that the liver is not  functioning as well       Recommendations:  - strict abstinence of alcohol use (includes beer, wine, and/or liquor)  - avoid non-steroidal anti-inflammatory drugs (NSAIDs) such as ibuprofen, Motrin, naprosyn, Aleve due to the risk of kidney damage  - can take acetaminophen (Tylenol), no more than 2000 mg per day  - low sodium (salt) 2 gram per day diet  - high protein diet to prevent muscle mass loss. Recommended at least 1 protein shake daily using Premier Protein shakes    - resistance exercises for muscle strength  - avoid raw seafoods due to the risk of fatal Vibrio vulnificus infection  - ultrasound of the liver every 6 months for liver cancer screening  - Upper endoscopy every 1-2 years to screen for varices in the stomach and esophagus

## 2020-11-12 NOTE — TELEPHONE ENCOUNTER
----- Message from Serina Garcia PA-C sent at 11/12/2020 11:05 AM CST -----  Please call pt schedule patient for ultrasound , labs (at preferred location) and f/u visit with me a few days after, in 6 mos. Give patient number for endoscopy scheduling.

## 2020-11-12 NOTE — PROGRESS NOTES
The patient location is: [place], [place]  The chief complaint leading to consultation is: SHEPPARD Cirrhosis    Visit type: audiovisual    Face to Face time with patient: 20  50 minutes of total time spent on the encounter, which includes face to face time and non-face to face time preparing to see the patient (eg, review of tests), Obtaining and/or reviewing separately obtained history, Documenting clinical information in the electronic or other health record, Independently interpreting results (not separately reported) and communicating results to the patient/family/caregiver, or Care coordination (not separately reported).         Each patient to whom he or she provides medical services by telemedicine is:  (1) informed of the relationship between the physician and patient and the respective role of any other health care provider with respect to management of the patient; and (2) notified that he or she may decline to receive medical services by telemedicine and may withdraw from such care at any time.    Notes:       [facility] - Established Patient     Last Clinic Visit: 6/4/2019 with Winsome Hodges NP     CHIEF COMPLAINT: SHEPPARD Cirrhosis     HPI: This is a 65 y.o. White male returning for well-compensated cirrhosis due to SHEPPARD.   The patient was previously followed by Winsome Hodges NP, is new to me today.     Presenting signs and symptoms: elevated enzymes & thrombocytopenia; no history of decompensation    Liver staging history:  - liver biopsy 6/2010 - severe steatohepatitis at 80% w/ stage 1 fibrosis.   - Fibrosure 2014 - cirrhosis, F4   - repeat liver biopsy 7/25/14 - mild steatohepatitis with stage 2-3 out of 4 fibrosis  - Fibroscan 12/2016 - F2/F3, kPa of 10.0  - Fibroscan 12/2018 - kPa 14.6, F4 cirrhosis, CAP = 341, S3     Cirrhosis Health Maintenance:  -- HCC screening:  U/S shows no lesions, next due 5/2020   AFP WNL, next due 5/2020  -- Variceal screening: never had EGD or colonoscopy, due  now  -- Hepatitis A & B vaccination: reportedly completed    Interval history:   He is here today alone.The patient feels well. (+) chronic fatigue, insomnia.    He reports he has gout in his ankle, takes colchicine as needed. Otherwise no NSAIDs.  Also w/ chronic sleeplessness, now on xanax 0.5mg nightly as needed. Melatonin has worked in the past for him.    For SHEPPARD - has been taking years of Vitamin E 800 units daily, stopped milk thistle. He was declined for SHEPPARD trials in the past due to low platelets.    Not exercising or losing weight within past two years. Drinks 1 soft drink per day, not watching diet.    Current symptoms of hepatic decompensation:              Ascites: Denies              LE edema: Denies              Hepatic encephalopathy:  Denies              Variceal/GI bleeding:  Denies, never had scope              Jaundice: denies    ETOH: Admits to drinking a case of beer on weekends from ages [age]. Is now not drinking whatsoever.  Family hx liver disease or cancer: denies        Review of patient's allergies indicates:   Allergen Reactions    Niacin preparations      Flushed, increased HR        Current Outpatient Medications on File Prior to Visit   Medication Sig Dispense Refill    atenolol (TENORMIN) 50 MG tablet Take 1 tablet by mouth Daily.      atorvastatin (LIPITOR) 10 MG tablet atorvastatin 10 mg tablet   1 po daily      losartan (COZAAR) 100 MG tablet Take 100 mg by mouth once daily.      milk thistle 175 mg tablet Take 175 mg by mouth 2 (two) times daily.      multivitamin (MULTIVITAMIN) per tablet Take by mouth Daily.      omega-3 acid ethyl esters (LOVAZA) 1 gram capsule Take 2 capsules by mouth Daily.      vitamin E 400 UNIT capsule Take 800 Units by mouth once daily.       No current facility-administered medications on file prior to visit.        PMHX:  has a past medical history of Cirrhosis of liver without ascites (12/19/2016), HLD (hyperlipidemia), Hypertension, ITP  (idiopathic thrombocytopenic purpura), and SHEPPARD (nonalcoholic steatohepatitis).    PSHX:  has a past surgical history that includes Bone marrow biopsy (8/6/10); Liver biopsy (6/11/10); and Liver biopsy (2014).    FAMILY HISTORY:   Family History   Problem Relation Age of Onset    Pancreatic cancer Father     Thrombocytopenia Brother     Liver disease Neg Hx     Colon cancer Neg Hx        SOCIAL HISTORY:   Social History     Tobacco Use   Smoking Status Former Smoker    Quit date: 1975    Years since quittin.9   Smokeless Tobacco Never Used       Social History     Substance and Sexual Activity   Alcohol Use No    Alcohol/week: 0.0 standard drinks    Comment: occasionally, once q 2-3 months       Social History     Substance and Sexual Activity   Drug Use No       Review of Systems   Constitutional: Negative for appetite change, chills,  fever and unexpected weight change. (+) fatigue, chronic  Eyes: Negative for visual disturbance.   Respiratory: Negative for cough and shortness of breath.    Cardiovascular: Negative for chest pain, palpitations and leg swelling.   Gastrointestinal: Negative for abdominal distention, abdominal pain, blood in stool, constipation, diarrhea, nausea and vomiting. No change in stool color.  Genitourinary: Negative for dysuria and hematuria. Denies dark urine.   Musculoskeletal: Negative for arthralgias, gait problem, joint swelling and myalgias.   Skin: Negative for color change, rash and wound. Denies itching.   Neurological: Negative for dizziness, tremors, speech difficulty, and headaches.   Hematological: Does not bruise/bleed easily.   Psychiatric/Behavioral: Negative for confusion, decreased concentration. (+) sleep disturbance, chronic. Denies memory loss. Denies depression. The patient is not nervous/anxious.        DATA     Physical Exam   Physical exam is limited by video visit:   Friendly White man, in no acute distress; alert and oriented to person, place  and time  VITALS: There were no vitals taken for this visit.   SKIN/EYES: No obvious jaundice or yellowing of the eyes.   HENT: Normocephalic, without obvious abnormality.   NECK: Supple.   CARDIOVASCULAR: BRITNI on video visit  RESPIRATORY: Normal respiratory effort.   GI: BRITNI hepatosplenomegaly  PSYCH: Thought and speech pattern appropriate.     LABS:  Lab Results   Component Value Date    WBC 7.83 11/05/2020    HGB 16.3 11/05/2020    HCT 48.8 11/05/2020    PLT 86 (L) 11/05/2020     11/05/2020    K 4.8 11/05/2020    CREATININE 1.3 11/05/2020    ALT 70 (H) 11/05/2020    AST 52 (H) 11/05/2020    ALKPHOS 56 11/05/2020    BILITOT 1.2 (H) 11/05/2020    ALBUMIN 4.4 11/05/2020    INR 1.1 11/05/2020    AFP 2.1 11/05/2020    SMOOTHMUSCAB Negative 01/15/2010    AMAIFA Negative 01/15/2010    IGGSERUM 1,493 01/15/2010    FERRITIN 293 01/15/2010    FESATURATED 46 01/15/2010    [id number] MM 01/15/2010    JEGMN1KOVCNU 107 01/15/2010    CERULOPLSM 25.8 01/15/2010    CHOL 194 06/29/2018    TRIG 175 (H) 06/29/2018    LDLCALC 130.0 06/29/2018    HGBA1C 5.4 06/29/2018       No results found for: HEPAIGG    No results found for: HEPBSAG  Hep B Core Total Ab   Date Value Ref Range Status   01/15/2010 Negative  Final     Hep B S Ab   Date Value Ref Range Status   01/15/2010 Negative  Final     No results found for: HEPCAB    There is no immunization history on file for this patient.       DIAGNOSTIC STUDIES:  ABDOMINAL Ultrasound -   Results for orders placed during the hospital encounter of 01/31/20   US Abdomen Complete    Narrative EXAMINATION:  US ABDOMEN COMPLETE    CLINICAL HISTORY:  Cirrhosis; Unspecified cirrhosis of liver    TECHNIQUE:  Complete abdominal ultrasound (including pancreas, aorta, liver, gallbladder, common bile duct, IVC, kidneys, and spleen) was performed.    COMPARISON:  06/04/2019    FINDINGS:  Pancreas: The visualized portions of pancreas appear normal.    Aorta: No aneurysm.    Liver: 18 cm, enlarged.   Fatty liver infiltration.  No focal lesions.    Gallbladder: No calculi, wall thickening, or pericholecystic fluid.  Negative sonographic Majano's sign.    Biliary system: 3.5 mm common bile duct.  No intrahepatic ductal dilatation.    Inferior vena cava: Normal in appearance.    Right kidney: 10.4 cm. Midpole cyst measures 2.1 cm.  No hydronephrosis.    Left kidney: 10.7 cm. Midpole cyst measures 1.2 cm.  No hydronephrosis.    Spleen: 13.4 cm.  Normal in size with homogeneous echotexture.    Miscellaneous: No ascites.      Impression Hepatosplenomegaly with fatty infiltration of the liver.    Bilateral simple renal cysts.      Electronically signed by: Hallie Baker MD  Date:    01/31/2020  Time:    09:08       Results for orders placed during the hospital encounter of 11/05/20   US Abdomen Limited    Narrative EXAMINATION:  US ABDOMEN LIMITED    CLINICAL HISTORY:  Unspecified cirrhosis of liver    TECHNIQUE:  Limited ultrasound of the right upper quadrant of the abdomen (including pancreas, liver, gallbladder, common bile duct, and right kidney) was performed.    COMPARISON:  01/31/2020    FINDINGS:  Liver: Enlarged measuring 19 cm in size. The liver demonstrates fatty liver infiltration.  No focal hepatic lesions are seen.    Biliary system: The gallbladder demonstrates no evidence of calculi. No gallbladder wall thickening.  No sonographic Majano sign. No pericholecystic fluid. The common duct is not dilated, measuring 0.37 cm. No intrahepatic ductal dilatation.    Pancreas: The visualized portions of pancreas appear normal    Spleen: Normal in size measuring 12.6 cm.    Vascular: The portions of the aorta, vena cava, and portal vein appear free of acute abnormality.    Miscellaneous: Incidentally noted is a right renal cyst at the midpole measuring 2.5 cm.      Impression Hepatomegaly with fatty infiltration of the liver.    Right renal cyst..      Electronically signed by: Hallie Baker  MD  Date:    11/05/2020  Time:    11:48     EDUCATION / DISCUSSION:      CIRRHOSIS:  Because you have cirrhosis, it is important to attend clinic visits every 6 months with an Ultrasound and blood tests every 6 months to screen for liver cancer (you are at risk of developing liver cancer due to scar tissue in the liver)    Signs and symptoms of worsening liver disease include jaundice, fluid in the belly (ascites), and confusion/disorientation/slowed thought processes due to hepatic encephalopathy (toxins building up because of liver problems).   You should seek medical attention if any of these things occur.    Also, possible bleeding from esophageal varices (blood vessels in the stomach and foodpipe can burst and cause fatal bleeding).  Therefore, if you have symptoms of vomiting blood, blood in your stool, dark or black stools or vomiting coffee ground vomit, YOU SHOULD GO TO THE EMERGENCY ROOM IMMEDIATELY.     Cirrhosis can increase the risk of liver cancer, liver failure, and death. However, we will watch your liver function score (MELD score) closely with each clinic visit. A normal MELD score is 6, highest is 40. Your last one was an 11. We will check this with every clinic visit. A MELD 15 or higher is when we start to consider transplant because MELD 15 or higher indicates that the liver is not functioning as well       Recommendations:  - strict abstinence of alcohol use (includes beer, wine, and/or liquor)  - avoid non-steroidal anti-inflammatory drugs (NSAIDs) such as ibuprofen, Motrin, naprosyn, Aleve due to the risk of kidney damage  - can take acetaminophen (Tylenol), no more than 2000 mg per day  - low sodium (salt) 2 gram per day diet  - high protein diet to prevent muscle mass loss. Recommended at least 1 protein shake daily using Premier Protein shakes    - resistance exercises for muscle strength  - avoid raw seafoods due to the risk of fatal Vibrio vulnificus infection  - ultrasound of the liver  every 6 months for liver cancer screening  - Upper endoscopy every 1-2 years to screen for varices in the stomach and esophagus      ASSESSMENT & PLAN     65 y.o. White male with:    1.  Cirrhosis, due to SHEPPARD, well compensated  -- MELD-Na score: 11 at 11/5/2020 11:09 AM  MELD score: 11 at 11/5/2020 11:09 AM  Calculated from:  Serum Creatinine: 1.3 mg/dL at 11/5/2020 11:09 AM  Serum Sodium: 144 mmol/L (Rounded to 137 mmol/L) at 11/5/2020 11:09 AM  Total Bilirubin: 1.2 mg/dL at 11/5/2020 11:09 AM  INR(ratio): 1.1 at 11/5/2020 11:09 AM  Age: 65 years 6 months   -- medically early for transplant.  -- completed vaccinations per patient  -- no ETOH   -- overdue for EGD  -- no concerning lesions on most recent u/s or AFP --> f/u in 6 months with labs and ultrasound for continued HCC monitoring.  -- Cirrhosis counseling as noted above and discussed with patient. Discussed with patient that do not recommend any elective abdominal surgery due to risk of hepatic decompensation.     2. SHEPPARD w/ elevated liver enzymes, metabolic syndrome w/ Class 2 obesity due to excess calories without serious comorbidity with body mass index (BMI) of 35.0 to 35.9 in adult, Hypertension, unspecified type,  Hyperlipidemia, unspecified hyperlipidemia type  Recommend:  1. Weight loss goal of 25 lbs  2. Low carb/sugar, high fiber and protein diet  3. Exercise, 5 days per week, 30 minutes per day, as tolerated  4. Recommend good cholesterol, blood pressure, blood sugar levels  -- lessen sugary drinks/processed foods  -- previously referred to nutrition, did not attend    3. Thrombocytopenia  - ITP on problem list, however likely 2/2 cirrhosis (?)    4. Insomnia  - recommend minimization of xanax/sedating medications. Alternatively, try melatonin        Orders Placed This Encounter   Procedures    US Abdomen Limited    AFP Tumor Marker    CBC Auto Differential    Comprehensive Metabolic Panel    Protime-INR       Obtain EGD/colonoscopy  Follow  up in about 6 months (around 5/12/2021).       Thank you for allowing me to participate in the care of Danny Garcia PA-C  Hepatology

## 2021-05-04 ENCOUNTER — PATIENT MESSAGE (OUTPATIENT)
Dept: RESEARCH | Facility: HOSPITAL | Age: 66
End: 2021-05-04

## 2021-05-12 ENCOUNTER — TELEPHONE (OUTPATIENT)
Dept: HEPATOLOGY | Facility: CLINIC | Age: 66
End: 2021-05-12

## 2021-06-01 ENCOUNTER — HOSPITAL ENCOUNTER (OUTPATIENT)
Dept: RADIOLOGY | Facility: HOSPITAL | Age: 66
Discharge: HOME | End: 2021-06-01
Attending: PHYSICIAN ASSISTANT
Payer: MEDICARE

## 2021-06-01 DIAGNOSIS — K74.60 CIRRHOSIS OF LIVER WITHOUT ASCITES, UNSPECIFIED HEPATIC CIRRHOSIS TYPE: ICD-10-CM

## 2021-06-01 PROCEDURE — 76705 US ABDOMEN LIMITED: ICD-10-PCS | Mod: 26,,, | Performed by: RADIOLOGY

## 2021-06-01 PROCEDURE — 76705 ECHO EXAM OF ABDOMEN: CPT | Mod: 26,,, | Performed by: RADIOLOGY

## 2021-06-01 PROCEDURE — 76705 ECHO EXAM OF ABDOMEN: CPT | Mod: TC

## 2021-06-02 ENCOUNTER — OFFICE VISIT (OUTPATIENT)
Dept: HEPATOLOGY | Facility: CLINIC | Age: 66
End: 2021-06-02
Payer: MEDICARE

## 2021-06-02 ENCOUNTER — TELEPHONE (OUTPATIENT)
Dept: HEPATOLOGY | Facility: CLINIC | Age: 66
End: 2021-06-02

## 2021-06-02 DIAGNOSIS — K75.81 NASH (NONALCOHOLIC STEATOHEPATITIS): ICD-10-CM

## 2021-06-02 DIAGNOSIS — E66.09 CLASS 2 OBESITY DUE TO EXCESS CALORIES WITHOUT SERIOUS COMORBIDITY WITH BODY MASS INDEX (BMI) OF 35.0 TO 35.9 IN ADULT: ICD-10-CM

## 2021-06-02 DIAGNOSIS — K74.60 CIRRHOSIS OF LIVER WITHOUT ASCITES, UNSPECIFIED HEPATIC CIRRHOSIS TYPE: Primary | ICD-10-CM

## 2021-06-02 DIAGNOSIS — D69.6 THROMBOCYTOPENIA: ICD-10-CM

## 2021-06-02 DIAGNOSIS — I10 HYPERTENSION, UNSPECIFIED TYPE: ICD-10-CM

## 2021-06-02 DIAGNOSIS — E78.5 HYPERLIPIDEMIA, UNSPECIFIED HYPERLIPIDEMIA TYPE: ICD-10-CM

## 2021-06-02 DIAGNOSIS — Z12.11 COLON CANCER SCREENING: ICD-10-CM

## 2021-06-02 PROCEDURE — 99214 PR OFFICE/OUTPT VISIT, EST, LEVL IV, 30-39 MIN: ICD-10-PCS | Mod: 95,,, | Performed by: PHYSICIAN ASSISTANT

## 2021-06-02 PROCEDURE — 99214 OFFICE O/P EST MOD 30 MIN: CPT | Mod: 95,,, | Performed by: PHYSICIAN ASSISTANT

## 2021-06-04 ENCOUNTER — PATIENT MESSAGE (OUTPATIENT)
Dept: HEPATOLOGY | Facility: CLINIC | Age: 66
End: 2021-06-04

## 2021-07-01 ENCOUNTER — PATIENT MESSAGE (OUTPATIENT)
Dept: HEPATOLOGY | Facility: CLINIC | Age: 66
End: 2021-07-01

## 2021-09-05 NOTE — PROGRESS NOTES
[facility] Established Patient Visit    Reason for Visit:  F/u SHEPPARD, cirrhosis    PCP: Edgar Clements    HPI:  This is a 64 y.o. male here for f/u of well compensated SHEPPARD cirrhosis. His liver staging in past suggested at least moderate fibrosis and fibroscan at last visit = F4 cirrhosis.     Liver staging history:  - liver biopsy 6/2010 - severe steatohepatitis at 80% w/ stage 1 fibrosis.   - Fibrosure 2014 - cirrhosis, F4   - repeat liver biopsy 7/25/14 - mild steatohepatitis with stage 2-3 out of 4 fibrosis  - Fibroscan 12/2016 - F2/F3, kPa of 10.0  - Fibroscan 12/2018 - kPa 14.6, F4 cirrhosis, CAP = 341, S3     He had labs and u/s today for HCC screening. He has stably mildly elevated transaminases, ALT > AST, and mild intermittent Tbili elevation up to 1.4. Normal alb and INR. He has a history of ITP, followed by Dr. Marley in the past. Plt count has been stable. Recommendations were to f/u with hematology again if plts < 40. U/s shows hepatomegaly with steatosis. No masses. Spleen now enlarged at 13.3 cm, previous u/s spleen was 10.8 cm. He has lost 9 more lbs since last visit. He is trying to follow healthy diet. He did not get EGD and colonoscopy d/t high out of pocket cost of $893. Changing insurance in 7/2019.     He reports he feels well and denies any symptoms of advanced chronic liver disease including jaundice, dark urine, hematemesis, melena, slowed mentation, abdominal distention. He is taking vitamin E 800 units daily. He wants to participate in SHEPPARD cirrhosis clinical trials, will submit him for this.    He is seeing dermatology in [place], Dr. Worrell, for skin cancer to face under left eye. He is not sure what type of skin cancer it is. Having further excision on 7/8/19.    ROS:   GENERAL: Denies fever, chills, (+) weight loss, no fatigue  HEENT: Denies headaches, dizziness, vision/hearing changes  CARDIOVASCULAR: Denies chest pain, palpitations, or edema  RESPIRATORY: Denies dyspnea,  cough  GI: Denies abdominal pain, rectal bleeding, nausea, vomiting. No change in bowel pattern or color  : Denies dysuria, hematuria   SKIN: Denies rash, itching   NEURO: Denies confusion, memory loss, or mood changes  PSYCH: Denies depression or anxiety  HEME/LYMPH: Denies easy bruising or bleeding      PMHX:  has a past medical history of Cirrhosis of liver without ascites (12/19/2016), HLD (hyperlipidemia), Hypertension, ITP (idiopathic thrombocytopenic purpura), and SHEPPARD (nonalcoholic steatohepatitis).    PSHX:  has a past surgical history that includes Bone marrow biopsy (8/6/10); Liver biopsy (6/11/10); and Liver biopsy (7/2014).    The patient's social and family histories were reviewed by me and updated in the appropriate section of the electronic medical record.    Review of patient's allergies indicates:   Allergen Reactions    Niacin preparations      Flushed, increased HR        Current Outpatient Medications on File Prior to Visit   Medication Sig Dispense Refill    atenolol (TENORMIN) 50 MG tablet Take 1 tablet by mouth Daily.      atorvastatin (LIPITOR) 10 MG tablet atorvastatin 10 mg tablet   1 po daily      losartan (COZAAR) 100 MG tablet Take 100 mg by mouth once daily.      multivitamin (MULTIVITAMIN) per tablet Take by mouth Daily.      vitamin E 400 UNIT capsule Take 800 Units by mouth once daily.      milk thistle 175 mg tablet Take 175 mg by mouth 2 (two) times daily.      omega-3 acid ethyl esters (LOVAZA) 1 gram capsule Take 2 capsules by mouth Daily.      [DISCONTINUED] colchicine 0.6 mg tablet Take 0.6 mg by mouth daily as needed.        No current facility-administered medications on file prior to visit.         Objective Findings:    PHYSICAL EXAM:   Friendly White male, in no acute distress; alert and oriented to person, place and time  VITALS:   BP (!) 167/108 (BP Location: Right arm, Patient Position: Sitting, BP Method: Medium (Automatic))   Pulse 77   Resp 18   Ht 6'  "4" (1.93 m)   Wt 127 kg (279 lb 15.8 oz)   SpO2 99%   BMI 34.08 kg/m²    HENT: Normocephalic, without obvious abnormality. Oral mucosa pink and moist. Dentition good.   EYES: Sclerae anicteric.   NECK: Supple.   CARDIOVASCULAR: Regular rate and rhythm. No murmurs.  RESPIRATORY: Normal respiratory effort. BBS CTA. No wheezes or crackles.  GI: Obese, soft, non-tender. Liver edge palpable. No masses palpable. No ascites.  EXTREMITIES:  No clubbing, cyanosis or edema.  SKIN: Warm and dry. No jaundice. No rashes noted to exposed skin. No telangectasias noted. No palmar erythema.  NEURO:  Normal gate. No asterixis.  PSYCH:  Memory intact. Thought and speech pattern appropriate. Behavior normal. No depression or anxiety noted.      Labs:  Lab Results   Component Value Date    WBC 9.26 06/04/2019    HGB 15.8 06/04/2019    HCT 46.2 06/04/2019    PLT 86 (L) 06/04/2019    CHOL 194 06/29/2018    TRIG 175 (H) 06/29/2018    HDL 29 (L) 06/29/2018     06/04/2019    K 4.1 06/04/2019    CREATININE 1.4 06/04/2019    ALT 58 (H) 06/04/2019    AST 36 06/04/2019    ALKPHOS 54 (L) 06/04/2019    BILITOT 0.7 06/04/2019    ALBUMIN 4.2 06/04/2019    INR 1.1 06/04/2019    AFP 2.0 11/30/2018     ABD U/S 6/4/19  FINDINGS:  Pancreas: The visualized portions of pancreas appear normal.    Aorta: No aneurysm.    Liver: 19.0 cm, ENLARGED Diffusely increased parenchymal echogenicity consistent with steatosis. No focal lesions.    Gallbladder: No calculi, wall thickening, or pericholecystic fluid.  Negative sonographic Majano's sign.    Biliary system: 4.5 mm common bile duct.  No intrahepatic ductal dilatation.    Inferior vena cava: Normal in appearance.    Right kidney: 10.9 cm. No hydronephrosis.  There is evidence of relatively small 2.0 x 1.5 x 1.3 cm cyst projecting the midpole region.  No solid mass.    Left kidney: 12.3 cm. No hydronephrosis.  There is evidence of relative small 1.3 x 1.1 x 1.2 cm cyst projecting in the midpole region. "  No solid mass.    Spleen: 13.3 cm.  Normal in size with homogeneous echotexture.    Miscellaneous: No ascites.      Impression       Present examination is evidence of fatty metamorphosis of the liver.  There is evidence of benign cyst occupying each kidney within the interpolar region bilaterally.  No acute sonographic findings are depicted.       ASSESSMENT:  64 y.o. White male with:  1.  SHEPPARD   -- transaminases elevated, ALT > AST  -- US- hepatosplenomegaly with fatty infiltration  -- synthetic liver function normal, Tbili minimally elevated with nl alb and INR  -- risk factors for fatty liver include obese with BMI 34, HTN, hypertriglyceridemia   2. Cirrhosis, based on Fibroscan = F4 12/2018, well compensated  -- MELD = MELD-Na score: 11 at 6/4/2019  7:54 AM  MELD score: 11 at 6/4/2019  7:54 AM  Calculated from:  Serum Creatinine: 1.4 mg/dL at 6/4/2019  7:54 AM  Serum Sodium: 141 mmol/L (Rounded to 137 mmol/L) at 6/4/2019  7:54 AM  Total Bilirubin: 0.7 mg/dL (Rounded to 1 mg/dL) at 6/4/2019  7:54 AM  INR(ratio): 1.1 at 6/4/2019  7:54 AM  Age: 64 years  -- HCC screening- AFP and abd. U/S - up to date, next due 12/2019  -- Immunity to Hep A and B - s/p hepatitis A and B vaccines done with PCP  -- EGD - none, did not do d/t out of pocket cost  -- liver biopsy 6/2010 - severe steatohepatitis at 80% w/ stage 1 fibrosis.   -- Fibrosure 2014 - cirrhosis, F4   -- repeat liver biopsy 7/25/14 - mild steatohepatitis with stage 2-3 out of 4 fibrosis  -- Fibroscan 12/2016 - F2/F3, kPa of 10.0  -- Fibroscan 12/2018 - kPa 14.6, F4 cirrhosis, CAP = 341, S3    3. ITP, stable   4. Colon cancer screening  -- did not have EGD/colonoscopy d/t high out of pocket expense  -- he is changing insurance in 7/2019 so will get done before end of the year    EDUCATION:   We discussed the manifestations of NAFLD. At this time there is no FDA approved therapy for NAFLD.   The patient and I discussed the importance of diet, exercise, and  weight loss for management of NAFLD. We discussed a low fat, low carb/low sugar, high fiber diet, and a goal of 30 minutes of exercise 5 times per week.   Pt is aware that fatty liver can progress to steatohepatitis and possibly to cirrhosis, putting one at increased risk for liver cancer, liver failure, and death.  Discussed weight loss will help to minimize progression of liver disease.     The disease process and manifestations of cirrhosis were discussed.    Signs and symptoms of hepatic decompensation were reviewed, including jaundice, ascites, and slowed mentation due to hepatic encephalopathy. The patient should seek medical attention if any of these things occur.  We discussed the potential for bleeding from esophageal varices with symptoms of hematemesis and melena. The patient should report to the Emergency Department for these symptoms.    We discussed the increased risk of hepatocellular carcinoma due to cirrhosis. Continued screening every six months with ultrasound and AFP is recommended.     No alcohol or raw seafood.  Tylenol/acetaminophen as needed for pain, up to 2000 mg daily    Discussed implications of a cirrhosis diagnosis with HCC screenings and EGD and why it is important for us to determine if pt's have cirrhosis so they can be properly monitored for potential complications.         PLAN:  1. HCC screening Q 6 months with AFP and abd. U/S, next due 12/2019  2. Continue Vitamin E 800 IU daily  3. Continue weight loss   4. Low fat, low cholesterol, low carb, high fiber diet  5. Exercise, 5 days a week, 30 min a day  6. Recommend good control of cholesterol  7. Discussed SHEPPARD fibrosis/cirrhosis clinical trials that are currently enrolling. He is interested in this again since he now has cirrhosis. Will re-submit him  8. EGD and colonoscopy reordered  9. Follow up in 12/2019 with lab for CMP, CBC, AFP, and INR and abd u/s to be done at [place] before appt    Thank you for allowing me to  participate in the care of Danny Hodges NP-C      stage III

## 2021-11-29 ENCOUNTER — HOSPITAL ENCOUNTER (OUTPATIENT)
Dept: RADIOLOGY | Facility: HOSPITAL | Age: 66
Discharge: HOME | End: 2021-11-29
Attending: PHYSICIAN ASSISTANT
Payer: MEDICARE

## 2021-11-29 DIAGNOSIS — K74.60 CIRRHOSIS OF LIVER WITHOUT ASCITES, UNSPECIFIED HEPATIC CIRRHOSIS TYPE: ICD-10-CM

## 2021-11-29 PROCEDURE — 76705 ECHO EXAM OF ABDOMEN: CPT | Mod: TC

## 2021-11-29 PROCEDURE — 76705 US ABDOMEN LIMITED: ICD-10-PCS | Mod: 26,,, | Performed by: RADIOLOGY

## 2021-11-29 PROCEDURE — 76705 ECHO EXAM OF ABDOMEN: CPT | Mod: 26,,, | Performed by: RADIOLOGY

## 2021-12-06 ENCOUNTER — TELEPHONE (OUTPATIENT)
Dept: HEPATOLOGY | Facility: CLINIC | Age: 66
End: 2021-12-06

## 2021-12-06 ENCOUNTER — OFFICE VISIT (OUTPATIENT)
Dept: HEPATOLOGY | Facility: CLINIC | Age: 66
End: 2021-12-06
Payer: MEDICARE

## 2021-12-06 DIAGNOSIS — I10 HYPERTENSION, UNSPECIFIED TYPE: ICD-10-CM

## 2021-12-06 DIAGNOSIS — D69.6 THROMBOCYTOPENIA: ICD-10-CM

## 2021-12-06 DIAGNOSIS — E78.5 HYPERLIPIDEMIA, UNSPECIFIED HYPERLIPIDEMIA TYPE: ICD-10-CM

## 2021-12-06 DIAGNOSIS — K74.60 CIRRHOSIS OF LIVER WITHOUT ASCITES, UNSPECIFIED HEPATIC CIRRHOSIS TYPE: Primary | ICD-10-CM

## 2021-12-06 DIAGNOSIS — E66.09 CLASS 2 OBESITY DUE TO EXCESS CALORIES WITHOUT SERIOUS COMORBIDITY WITH BODY MASS INDEX (BMI) OF 35.0 TO 35.9 IN ADULT: ICD-10-CM

## 2021-12-06 DIAGNOSIS — K75.81 NASH (NONALCOHOLIC STEATOHEPATITIS): ICD-10-CM

## 2021-12-06 PROCEDURE — 99214 OFFICE O/P EST MOD 30 MIN: CPT | Mod: 95,,, | Performed by: PHYSICIAN ASSISTANT

## 2021-12-06 PROCEDURE — 99214 PR OFFICE/OUTPT VISIT, EST, LEVL IV, 30-39 MIN: ICD-10-PCS | Mod: 95,,, | Performed by: PHYSICIAN ASSISTANT

## 2021-12-06 RX ORDER — COLCHICINE 0.6 MG/1
CAPSULE ORAL
COMMUNITY

## 2021-12-06 RX ORDER — CALCIUM CARB/VITAMIN D3/VIT K1 500-500-40
TABLET,CHEWABLE ORAL
COMMUNITY

## 2021-12-06 RX ORDER — AMLODIPINE BESYLATE 5 MG/1
TABLET ORAL
COMMUNITY
Start: 2021-10-06

## 2021-12-06 RX ORDER — ATENOLOL 50 MG/1
TABLET ORAL
COMMUNITY
End: 2021-12-06

## 2021-12-06 RX ORDER — CHOLECALCIFEROL (VITAMIN D3) 50 MCG
CAPSULE ORAL
COMMUNITY
End: 2021-12-06

## 2021-12-07 ENCOUNTER — TELEPHONE (OUTPATIENT)
Dept: HEPATOLOGY | Facility: CLINIC | Age: 66
End: 2021-12-07
Payer: MEDICARE

## 2021-12-28 ENCOUNTER — PATIENT MESSAGE (OUTPATIENT)
Dept: HEPATOLOGY | Facility: CLINIC | Age: 66
End: 2021-12-28
Payer: MEDICARE

## 2022-03-08 ENCOUNTER — PATIENT MESSAGE (OUTPATIENT)
Dept: HEPATOLOGY | Facility: CLINIC | Age: 67
End: 2022-03-08
Payer: MEDICARE

## 2022-04-09 ENCOUNTER — PATIENT MESSAGE (OUTPATIENT)
Dept: HEPATOLOGY | Facility: CLINIC | Age: 67
End: 2022-04-09
Payer: MEDICARE

## 2022-06-07 ENCOUNTER — HOSPITAL ENCOUNTER (OUTPATIENT)
Dept: RADIOLOGY | Facility: HOSPITAL | Age: 67
Discharge: HOME | End: 2022-06-07
Attending: PHYSICIAN ASSISTANT
Payer: MEDICARE

## 2022-06-07 DIAGNOSIS — K74.60 CIRRHOSIS OF LIVER WITHOUT ASCITES, UNSPECIFIED HEPATIC CIRRHOSIS TYPE: ICD-10-CM

## 2022-06-07 PROCEDURE — 76705 ECHO EXAM OF ABDOMEN: CPT | Mod: TC

## 2022-06-07 PROCEDURE — 76705 US ABDOMEN LIMITED: ICD-10-PCS | Mod: 26,,, | Performed by: RADIOLOGY

## 2022-06-07 PROCEDURE — 76705 ECHO EXAM OF ABDOMEN: CPT | Mod: 26,,, | Performed by: RADIOLOGY

## 2022-06-14 ENCOUNTER — TELEPHONE (OUTPATIENT)
Dept: HEPATOLOGY | Facility: CLINIC | Age: 67
End: 2022-06-14

## 2022-06-14 ENCOUNTER — OFFICE VISIT (OUTPATIENT)
Dept: HEPATOLOGY | Facility: CLINIC | Age: 67
End: 2022-06-14
Payer: MEDICARE

## 2022-06-14 DIAGNOSIS — E78.5 HYPERLIPIDEMIA, UNSPECIFIED HYPERLIPIDEMIA TYPE: ICD-10-CM

## 2022-06-14 DIAGNOSIS — D69.6 THROMBOCYTOPENIA: ICD-10-CM

## 2022-06-14 DIAGNOSIS — K75.81 NASH (NONALCOHOLIC STEATOHEPATITIS): ICD-10-CM

## 2022-06-14 DIAGNOSIS — I10 HYPERTENSION, UNSPECIFIED TYPE: ICD-10-CM

## 2022-06-14 DIAGNOSIS — K74.60 CIRRHOSIS OF LIVER WITHOUT ASCITES, UNSPECIFIED HEPATIC CIRRHOSIS TYPE: Primary | ICD-10-CM

## 2022-06-14 DIAGNOSIS — E66.09 CLASS 2 OBESITY DUE TO EXCESS CALORIES WITHOUT SERIOUS COMORBIDITY WITH BODY MASS INDEX (BMI) OF 35.0 TO 35.9 IN ADULT: ICD-10-CM

## 2022-06-14 DIAGNOSIS — Z12.11 COLON CANCER SCREENING: ICD-10-CM

## 2022-06-14 PROCEDURE — 1160F RVW MEDS BY RX/DR IN RCRD: CPT | Mod: CPTII,95,, | Performed by: PHYSICIAN ASSISTANT

## 2022-06-14 PROCEDURE — 1159F MED LIST DOCD IN RCRD: CPT | Mod: CPTII,95,, | Performed by: PHYSICIAN ASSISTANT

## 2022-06-14 PROCEDURE — 1160F PR REVIEW ALL MEDS BY PRESCRIBER/CLIN PHARMACIST DOCUMENTED: ICD-10-PCS | Mod: CPTII,95,, | Performed by: PHYSICIAN ASSISTANT

## 2022-06-14 PROCEDURE — 1159F PR MEDICATION LIST DOCUMENTED IN MEDICAL RECORD: ICD-10-PCS | Mod: CPTII,95,, | Performed by: PHYSICIAN ASSISTANT

## 2022-06-14 PROCEDURE — 4010F PR ACE/ARB THEARPY RXD/TAKEN: ICD-10-PCS | Mod: CPTII,95,, | Performed by: PHYSICIAN ASSISTANT

## 2022-06-14 PROCEDURE — 99214 PR OFFICE/OUTPT VISIT, EST, LEVL IV, 30-39 MIN: ICD-10-PCS | Mod: 95,,, | Performed by: PHYSICIAN ASSISTANT

## 2022-06-14 PROCEDURE — 4010F ACE/ARB THERAPY RXD/TAKEN: CPT | Mod: CPTII,95,, | Performed by: PHYSICIAN ASSISTANT

## 2022-06-14 PROCEDURE — 99214 OFFICE O/P EST MOD 30 MIN: CPT | Mod: 95,,, | Performed by: PHYSICIAN ASSISTANT
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
## 2022-09-05 ENCOUNTER — PATIENT MESSAGE (OUTPATIENT)
Dept: HEPATOLOGY | Facility: CLINIC | Age: 67
End: 2022-09-05
Payer: MEDICARE

## 2022-09-15 ENCOUNTER — PATIENT MESSAGE (OUTPATIENT)
Dept: HEPATOLOGY | Facility: CLINIC | Age: 67
End: 2022-09-15
Payer: MEDICARE

## 2022-09-16 ENCOUNTER — PATIENT MESSAGE (OUTPATIENT)
Dept: HEPATOLOGY | Facility: CLINIC | Age: 67
End: 2022-09-16
Payer: MEDICARE

## 2022-09-26 ENCOUNTER — TELEPHONE (OUTPATIENT)
Dept: ENDOSCOPY | Facility: HOSPITAL | Age: 67
End: 2022-09-26
Payer: MEDICARE

## 2022-09-26 NOTE — TELEPHONE ENCOUNTER
Called pt to schedule EGD & colonoscopy, no answer. Left voicemail for pt to call the Endoscopy Scheduling Dept. [phone]

## 2022-09-30 DIAGNOSIS — Z12.11 COLON CANCER SCREENING: ICD-10-CM

## 2022-09-30 DIAGNOSIS — K74.60 CIRRHOSIS OF LIVER WITHOUT ASCITES, UNSPECIFIED HEPATIC CIRRHOSIS TYPE: Primary | ICD-10-CM

## 2022-12-14 ENCOUNTER — PATIENT MESSAGE (OUTPATIENT)
Dept: HEPATOLOGY | Facility: CLINIC | Age: 67
End: 2022-12-14
Payer: MEDICARE

## 2022-12-14 ENCOUNTER — HOSPITAL ENCOUNTER (OUTPATIENT)
Dept: RADIOLOGY | Facility: HOSPITAL | Age: 67
Discharge: HOME | End: 2022-12-14
Attending: PHYSICIAN ASSISTANT
Payer: MEDICARE

## 2022-12-14 ENCOUNTER — TELEPHONE (OUTPATIENT)
Dept: HEPATOLOGY | Facility: CLINIC | Age: 67
End: 2022-12-14
Payer: MEDICARE

## 2022-12-14 DIAGNOSIS — K74.60 CIRRHOSIS OF LIVER WITHOUT ASCITES, UNSPECIFIED HEPATIC CIRRHOSIS TYPE: ICD-10-CM

## 2022-12-14 DIAGNOSIS — K76.9 LIVER DISEASE, UNSPECIFIED: Primary | ICD-10-CM

## 2022-12-14 PROCEDURE — 76705 ECHO EXAM OF ABDOMEN: CPT | Mod: 26,,, | Performed by: RADIOLOGY

## 2022-12-14 PROCEDURE — 76705 US ABDOMEN LIMITED: ICD-10-PCS | Mod: 26,,, | Performed by: RADIOLOGY

## 2022-12-14 PROCEDURE — 76705 ECHO EXAM OF ABDOMEN: CPT | Mod: TC

## 2022-12-16 ENCOUNTER — PATIENT MESSAGE (OUTPATIENT)
Dept: HEPATOLOGY | Facility: CLINIC | Age: 67
End: 2022-12-16
Payer: MEDICARE

## 2022-12-16 ENCOUNTER — TELEPHONE (OUTPATIENT)
Dept: HEPATOLOGY | Facility: CLINIC | Age: 67
End: 2022-12-16
Payer: MEDICARE

## 2022-12-16 ENCOUNTER — HOSPITAL ENCOUNTER (OUTPATIENT)
Dept: RADIOLOGY | Facility: HOSPITAL | Age: 67
Discharge: HOME | End: 2022-12-16
Attending: PHYSICIAN ASSISTANT
Payer: MEDICARE

## 2022-12-16 DIAGNOSIS — K76.9 LIVER DISEASE, UNSPECIFIED: ICD-10-CM

## 2022-12-16 PROCEDURE — 74183 MRI ABDOMEN W WO CONTRAST: ICD-10-PCS | Mod: 26,,, | Performed by: RADIOLOGY

## 2022-12-16 PROCEDURE — 25500020 PHARM REV CODE 255: Performed by: PHYSICIAN ASSISTANT

## 2022-12-16 PROCEDURE — 74183 MRI ABD W/O CNTR FLWD CNTR: CPT | Mod: TC

## 2022-12-16 PROCEDURE — A9585 GADOBUTROL INJECTION: HCPCS | Performed by: PHYSICIAN ASSISTANT

## 2022-12-16 PROCEDURE — 74183 MRI ABD W/O CNTR FLWD CNTR: CPT | Mod: 26,,, | Performed by: RADIOLOGY

## 2022-12-16 RX ORDER — GADOBUTROL 604.72 MG/ML
10 INJECTION INTRAVENOUS
Status: COMPLETED | OUTPATIENT
Start: 2022-12-16 | End: 2022-12-16

## 2022-12-16 RX ADMIN — GADOBUTROL 10 ML: 604.72 INJECTION INTRAVENOUS at 11:12

## 2022-12-21 ENCOUNTER — OFFICE VISIT (OUTPATIENT)
Dept: HEPATOLOGY | Facility: CLINIC | Age: 67
End: 2022-12-21
Payer: MEDICARE

## 2022-12-21 DIAGNOSIS — I10 HYPERTENSION, UNSPECIFIED TYPE: ICD-10-CM

## 2022-12-21 DIAGNOSIS — K74.60 CIRRHOSIS OF LIVER WITHOUT ASCITES, UNSPECIFIED HEPATIC CIRRHOSIS TYPE: Primary | ICD-10-CM

## 2022-12-21 DIAGNOSIS — E66.09 CLASS 2 OBESITY DUE TO EXCESS CALORIES WITHOUT SERIOUS COMORBIDITY WITH BODY MASS INDEX (BMI) OF 35.0 TO 35.9 IN ADULT: ICD-10-CM

## 2022-12-21 DIAGNOSIS — Z12.11 COLON CANCER SCREENING: ICD-10-CM

## 2022-12-21 DIAGNOSIS — K75.81 NASH (NONALCOHOLIC STEATOHEPATITIS): ICD-10-CM

## 2022-12-21 DIAGNOSIS — D69.6 THROMBOCYTOPENIA: ICD-10-CM

## 2022-12-21 DIAGNOSIS — E78.5 HYPERLIPIDEMIA, UNSPECIFIED HYPERLIPIDEMIA TYPE: ICD-10-CM

## 2022-12-21 PROCEDURE — 1159F MED LIST DOCD IN RCRD: CPT | Mod: CPTII,95,, | Performed by: PHYSICIAN ASSISTANT

## 2022-12-21 PROCEDURE — 1159F PR MEDICATION LIST DOCUMENTED IN MEDICAL RECORD: ICD-10-PCS | Mod: CPTII,95,, | Performed by: PHYSICIAN ASSISTANT

## 2022-12-21 PROCEDURE — 1160F RVW MEDS BY RX/DR IN RCRD: CPT | Mod: CPTII,95,, | Performed by: PHYSICIAN ASSISTANT

## 2022-12-21 PROCEDURE — 99214 OFFICE O/P EST MOD 30 MIN: CPT | Mod: 95,,, | Performed by: PHYSICIAN ASSISTANT

## 2022-12-21 PROCEDURE — 4010F ACE/ARB THERAPY RXD/TAKEN: CPT | Mod: CPTII,95,, | Performed by: PHYSICIAN ASSISTANT

## 2022-12-21 PROCEDURE — 4010F PR ACE/ARB THEARPY RXD/TAKEN: ICD-10-PCS | Mod: CPTII,95,, | Performed by: PHYSICIAN ASSISTANT

## 2022-12-21 PROCEDURE — 1160F PR REVIEW ALL MEDS BY PRESCRIBER/CLIN PHARMACIST DOCUMENTED: ICD-10-PCS | Mod: CPTII,95,, | Performed by: PHYSICIAN ASSISTANT

## 2022-12-21 PROCEDURE — 99214 PR OFFICE/OUTPT VISIT, EST, LEVL IV, 30-39 MIN: ICD-10-PCS | Mod: 95,,, | Performed by: PHYSICIAN ASSISTANT

## 2022-12-21 NOTE — Clinical Note
Please call patient to schedule follow-up visit in 6 months, with labs (CBC CMP INR AFP) and ultrasound prior. Thanks!

## 2022-12-21 NOTE — PATIENT INSTRUCTIONS
Margi endoscopy  Congrats on weight loss  MRI ok  Recommend ultrasound and labs in 6 mos      Because you have cirrhosis, it is important to attend clinic visits every 6 months with an Ultrasound and blood tests every 6 months to screen for liver cancer (you are at risk of developing liver cancer due to scar tissue in the liver)    Signs and symptoms of worsening liver disease include jaundice, fluid in the belly (ascites), and confusion/disorientation/slowed thought processes due to hepatic encephalopathy (toxins building up because of liver problems).   You should seek medical attention if any of these things occur.    Also, possible bleeding from esophageal varices (blood vessels in the stomach and foodpipe can burst and cause fatal bleeding).  Therefore, if you have symptoms of vomiting blood, blood in your stool, dark or black stools or vomiting coffee ground vomit, YOU SHOULD GO TO THE EMERGENCY ROOM IMMEDIATELY.     Cirrhosis can increase the risk of liver cancer, liver failure, and death. However, we will watch your liver function score (MELD score) closely with each clinic visit. A normal MELD score is 6, highest is 40.  We will check this with every clinic visit. A MELD 15 or higher is when we start to consider transplant because MELD 15 or higher indicates that the liver is not functioning as well       Cirrhosis Counseling  - strict abstinence of alcohol use (includes beer, wine, and/or liquor)  - avoid non-steroidal anti-inflammatory drugs (NSAIDs) such as ibuprofen, Motrin, naprosyn, Aleve due to the risk of kidney damage  - can take acetaminophen (Tylenol), no more than 2000 mg per day  - low sodium (salt) 2 gram per day diet  - high protein diet: 1.5g/kg to prevent muscle mass loss. Recommended at least 1 protein shake daily using Premier Protein shakes    - resistance exercises for muscle strength  - avoid raw seafoods due to the risk of fatal Vibrio vulnificus infection  - ultrasound of the  liver every 6 months for liver cancer screening  - Upper endoscopy every 1-2 years to screen for varices in the stomach and esophagus
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
## 2023-02-22 ENCOUNTER — PATIENT MESSAGE (OUTPATIENT)
Dept: HEPATOLOGY | Facility: CLINIC | Age: 68
End: 2023-02-22
Payer: MEDICARE

## 2023-02-22 DIAGNOSIS — K74.60 CIRRHOSIS OF LIVER WITHOUT ASCITES, UNSPECIFIED HEPATIC CIRRHOSIS TYPE: Primary | ICD-10-CM

## 2023-02-22 NOTE — TELEPHONE ENCOUNTER
Contacted patient and got his lab in 3 weeks scheduled in [place]. Appointment reminder mailed to patient.

## 2023-03-16 ENCOUNTER — LAB VISIT (OUTPATIENT)
Dept: LAB | Facility: HOSPITAL | Age: 68
End: 2023-03-16
Attending: PHYSICIAN ASSISTANT
Payer: MEDICARE

## 2023-03-16 DIAGNOSIS — K74.60 CIRRHOSIS OF LIVER WITHOUT ASCITES, UNSPECIFIED HEPATIC CIRRHOSIS TYPE: ICD-10-CM

## 2023-03-16 LAB
ALBUMIN SERPL BCP-MCNC: 4.2 G/DL (ref 3.5–5.2)
ALP SERPL-CCNC: 70 U/L (ref 55–135)
ALT SERPL W/O P-5'-P-CCNC: 47 U/L (ref 10–44)
AST SERPL-CCNC: 37 U/L (ref 10–40)
BILIRUB DIRECT SERPL-MCNC: 0.5 MG/DL (ref 0.1–0.3)
BILIRUB SERPL-MCNC: 1.5 MG/DL (ref 0.1–1)
INR PPP: 1.1 (ref 0.8–1.2)
PROT SERPL-MCNC: 7.8 G/DL (ref 6–8.4)
PROTHROMBIN TIME: 11.4 SEC (ref 9–12.5)

## 2023-03-16 PROCEDURE — 36415 COLL VENOUS BLD VENIPUNCTURE: CPT | Performed by: PHYSICIAN ASSISTANT

## 2023-03-16 PROCEDURE — 80076 HEPATIC FUNCTION PANEL: CPT | Performed by: PHYSICIAN ASSISTANT

## 2023-03-16 PROCEDURE — 85610 PROTHROMBIN TIME: CPT | Performed by: PHYSICIAN ASSISTANT

## 2023-03-17 ENCOUNTER — PATIENT MESSAGE (OUTPATIENT)
Dept: HEPATOLOGY | Facility: CLINIC | Age: 68
End: 2023-03-17
Payer: MEDICARE

## 2023-06-14 ENCOUNTER — HOSPITAL ENCOUNTER (OUTPATIENT)
Dept: RADIOLOGY | Facility: HOSPITAL | Age: 68
Discharge: HOME | End: 2023-06-14
Attending: PHYSICIAN ASSISTANT
Payer: MEDICARE

## 2023-06-14 DIAGNOSIS — K74.60 CIRRHOSIS OF LIVER WITHOUT ASCITES, UNSPECIFIED HEPATIC CIRRHOSIS TYPE: ICD-10-CM

## 2023-06-14 PROCEDURE — 76705 ECHO EXAM OF ABDOMEN: CPT | Mod: 26,,, | Performed by: RADIOLOGY

## 2023-06-14 PROCEDURE — 76705 US ABDOMEN LIMITED: ICD-10-PCS | Mod: 26,,, | Performed by: RADIOLOGY

## 2023-06-14 PROCEDURE — 76705 ECHO EXAM OF ABDOMEN: CPT | Mod: TC

## 2023-06-21 ENCOUNTER — TELEPHONE (OUTPATIENT)
Dept: HEPATOLOGY | Facility: CLINIC | Age: 68
End: 2023-06-21

## 2023-06-21 ENCOUNTER — PATIENT MESSAGE (OUTPATIENT)
Dept: HEPATOLOGY | Facility: CLINIC | Age: 68
End: 2023-06-21

## 2023-06-21 ENCOUNTER — OFFICE VISIT (OUTPATIENT)
Dept: HEPATOLOGY | Facility: CLINIC | Age: 68
End: 2023-06-21
Payer: MEDICARE

## 2023-06-21 DIAGNOSIS — E66.09 CLASS 2 OBESITY DUE TO EXCESS CALORIES WITHOUT SERIOUS COMORBIDITY WITH BODY MASS INDEX (BMI) OF 35.0 TO 35.9 IN ADULT: ICD-10-CM

## 2023-06-21 DIAGNOSIS — D69.6 THROMBOCYTOPENIA: ICD-10-CM

## 2023-06-21 DIAGNOSIS — K74.60 CIRRHOSIS OF LIVER WITHOUT ASCITES, UNSPECIFIED HEPATIC CIRRHOSIS TYPE: Primary | ICD-10-CM

## 2023-06-21 DIAGNOSIS — K75.81 NASH (NONALCOHOLIC STEATOHEPATITIS): ICD-10-CM

## 2023-06-21 DIAGNOSIS — I10 HYPERTENSION, UNSPECIFIED TYPE: ICD-10-CM

## 2023-06-21 PROCEDURE — 1159F PR MEDICATION LIST DOCUMENTED IN MEDICAL RECORD: ICD-10-PCS | Mod: CPTII,95,, | Performed by: PHYSICIAN ASSISTANT

## 2023-06-21 PROCEDURE — 99214 PR OFFICE/OUTPT VISIT, EST, LEVL IV, 30-39 MIN: ICD-10-PCS | Mod: 95,,, | Performed by: PHYSICIAN ASSISTANT

## 2023-06-21 PROCEDURE — 4010F PR ACE/ARB THEARPY RXD/TAKEN: ICD-10-PCS | Mod: CPTII,95,, | Performed by: PHYSICIAN ASSISTANT

## 2023-06-21 PROCEDURE — 1159F MED LIST DOCD IN RCRD: CPT | Mod: CPTII,95,, | Performed by: PHYSICIAN ASSISTANT

## 2023-06-21 PROCEDURE — 1160F PR REVIEW ALL MEDS BY PRESCRIBER/CLIN PHARMACIST DOCUMENTED: ICD-10-PCS | Mod: CPTII,95,, | Performed by: PHYSICIAN ASSISTANT

## 2023-06-21 PROCEDURE — 4010F ACE/ARB THERAPY RXD/TAKEN: CPT | Mod: CPTII,95,, | Performed by: PHYSICIAN ASSISTANT

## 2023-06-21 PROCEDURE — 99214 OFFICE O/P EST MOD 30 MIN: CPT | Mod: 95,,, | Performed by: PHYSICIAN ASSISTANT

## 2023-06-21 PROCEDURE — 1160F RVW MEDS BY RX/DR IN RCRD: CPT | Mod: CPTII,95,, | Performed by: PHYSICIAN ASSISTANT
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
## 2023-09-08 ENCOUNTER — PATIENT MESSAGE (OUTPATIENT)
Dept: HEPATOLOGY | Facility: CLINIC | Age: 68
End: 2023-09-08
Payer: MEDICARE

## 2023-09-19 ENCOUNTER — TELEPHONE (OUTPATIENT)
Dept: HEPATOLOGY | Facility: CLINIC | Age: 68
End: 2023-09-19
Payer: MEDICARE

## 2023-10-19 ENCOUNTER — PATIENT MESSAGE (OUTPATIENT)
Dept: HEPATOLOGY | Facility: CLINIC | Age: 68
End: 2023-10-19
Payer: MEDICARE

## 2023-12-01 ENCOUNTER — HOSPITAL ENCOUNTER (OUTPATIENT)
Dept: RADIOLOGY | Facility: HOSPITAL | Age: 68
Discharge: HOME | End: 2023-12-01
Attending: PHYSICIAN ASSISTANT
Payer: MEDICARE

## 2023-12-01 DIAGNOSIS — K74.60 CIRRHOSIS OF LIVER WITHOUT ASCITES, UNSPECIFIED HEPATIC CIRRHOSIS TYPE: ICD-10-CM

## 2023-12-01 DIAGNOSIS — K75.81 NASH (NONALCOHOLIC STEATOHEPATITIS): ICD-10-CM

## 2023-12-01 PROCEDURE — 76705 ECHO EXAM OF ABDOMEN: CPT | Mod: 26,,, | Performed by: RADIOLOGY

## 2023-12-01 PROCEDURE — 76705 US ABDOMEN LIMITED: ICD-10-PCS | Mod: 26,,, | Performed by: RADIOLOGY

## 2023-12-01 PROCEDURE — 76705 ECHO EXAM OF ABDOMEN: CPT | Mod: TC

## 2023-12-06 ENCOUNTER — PATIENT MESSAGE (OUTPATIENT)
Dept: HEPATOLOGY | Facility: CLINIC | Age: 68
End: 2023-12-06

## 2023-12-06 ENCOUNTER — OFFICE VISIT (OUTPATIENT)
Dept: HEPATOLOGY | Facility: CLINIC | Age: 68
End: 2023-12-06
Payer: MEDICARE

## 2023-12-06 DIAGNOSIS — K74.60 LIVER CIRRHOSIS SECONDARY TO NASH: Primary | ICD-10-CM

## 2023-12-06 DIAGNOSIS — K76.6 PORTAL HYPERTENSION: ICD-10-CM

## 2023-12-06 DIAGNOSIS — K75.81 LIVER CIRRHOSIS SECONDARY TO NASH: Primary | ICD-10-CM

## 2023-12-06 DIAGNOSIS — E66.09 CLASS 1 OBESITY DUE TO EXCESS CALORIES WITH SERIOUS COMORBIDITY AND BODY MASS INDEX (BMI) OF 34.0 TO 34.9 IN ADULT: ICD-10-CM

## 2023-12-06 DIAGNOSIS — I10 PRIMARY HYPERTENSION: ICD-10-CM

## 2023-12-06 DIAGNOSIS — E78.5 HYPERLIPIDEMIA, UNSPECIFIED HYPERLIPIDEMIA TYPE: ICD-10-CM

## 2023-12-06 PROBLEM — E66.811 CLASS 1 OBESITY DUE TO EXCESS CALORIES WITH SERIOUS COMORBIDITY AND BODY MASS INDEX (BMI) OF 34.0 TO 34.9 IN ADULT: Status: ACTIVE | Noted: 2018-12-06

## 2023-12-06 PROCEDURE — 4010F ACE/ARB THERAPY RXD/TAKEN: CPT | Mod: CPTII,95,, | Performed by: NURSE PRACTITIONER

## 2023-12-06 PROCEDURE — 1159F PR MEDICATION LIST DOCUMENTED IN MEDICAL RECORD: ICD-10-PCS | Mod: CPTII,95,, | Performed by: NURSE PRACTITIONER

## 2023-12-06 PROCEDURE — 99214 PR OFFICE/OUTPT VISIT, EST, LEVL IV, 30-39 MIN: ICD-10-PCS | Mod: 95,,, | Performed by: NURSE PRACTITIONER

## 2023-12-06 PROCEDURE — 1160F PR REVIEW ALL MEDS BY PRESCRIBER/CLIN PHARMACIST DOCUMENTED: ICD-10-PCS | Mod: CPTII,95,, | Performed by: NURSE PRACTITIONER

## 2023-12-06 PROCEDURE — 4010F PR ACE/ARB THEARPY RXD/TAKEN: ICD-10-PCS | Mod: CPTII,95,, | Performed by: NURSE PRACTITIONER

## 2023-12-06 PROCEDURE — 1160F RVW MEDS BY RX/DR IN RCRD: CPT | Mod: CPTII,95,, | Performed by: NURSE PRACTITIONER

## 2023-12-06 PROCEDURE — 99214 OFFICE O/P EST MOD 30 MIN: CPT | Mod: 95,,, | Performed by: NURSE PRACTITIONER

## 2023-12-06 PROCEDURE — 1159F MED LIST DOCD IN RCRD: CPT | Mod: CPTII,95,, | Performed by: NURSE PRACTITIONER

## 2023-12-06 RX ORDER — CYANOCOBALAMIN 1000 UG/ML
1000 INJECTION, SOLUTION INTRAMUSCULAR; SUBCUTANEOUS WEEKLY
COMMUNITY
Start: 2023-11-14

## 2023-12-06 NOTE — PATIENT INSTRUCTIONS
This is a web site that you may find helpful about cirrhosis : [email or web address]    Because you have cirrhosis, it is important to attend clinic visits every 6 months with an Ultrasound and blood tests every 6 months to screen for liver cancer (you are at risk of developing liver cancer due to scar tissue in the liver)    Signs and symptoms of worsening liver disease include jaundice, fluid in the belly (ascites), and confusion/disorientation/slowed thought processes due to hepatic encephalopathy (toxins building up because of liver problems).   You should seek medical attention if any of these things occur.    Also, possible bleeding from esophageal varices (blood vessels in the stomach and foodpipe can burst and cause fatal bleeding).  Therefore, if you have symptoms of vomiting blood, blood in your stool, dark or black stools or vomiting coffee ground vomit, YOU SHOULD GO TO THE EMERGENCY ROOM IMMEDIATELY.     Cirrhosis can increase the risk of liver cancer, liver failure, and death. However, we will watch your liver function score (MELD score) closely with each clinic visit. A normal MELD score is 6, highest is 40. Your last one was an 10. We will check this with every clinic visit. A MELD 15 or higher is when we start to consider transplant because MELD 15 or higher indicates that the liver is not functioning as well     Cirrhosis Counseling  - NO alcohol use (includes beer, wine, and/or liquor)  - avoid non-steroidal anti-inflammatory drugs (NSAIDs) such as ibuprofen, Motrin, naprosyn, Alleve due to the risk of kidney damage  - can take acetaminophen (Tylenol), no more than 2000 mg per day  - low sodium (salt) 2 gram per day diet  - high protein diet: 120 grams per day to prevent muscle mass loss. Drink at least 1 protein shake daily (Premier Protein is best option because it is very high protein and low sugar). Ok to use this as nighttime snack to fit it in   - resistance exercises for muscle  strength  - avoid raw seafoods due to the risk of fatal Vibrio vulnificus infection  - ultrasound of the liver every 6 months for liver cancer screening (you are at risk of developing liver cancer due to scar tissue in the liver)  - Upper endoscopy every 1-2 years to screen for varices in the stomach and foodpipe which can burst and cause fatal bleeding

## 2023-12-06 NOTE — PROGRESS NOTES
The patient location is: [place]  The chief complaint leading to consultation is: see below    Visit type: audiovisual    Face to Face time with patient: 30 minutes of total time spent on the encounter, which includes face to face time and non-face to face time preparing to see the patient (eg, review of tests), Obtaining and/or reviewing separately obtained history, Documenting clinical information in the electronic or other health record, Independently interpreting results (not separately reported) and communicating results to the patient/family/caregiver, or Care coordination (not separately reported).         Each patient to whom he or she provides medical services by telemedicine is:  (1) informed of the relationship between the physician and patient and the respective role of any other health care provider with respect to management of the patient; and (2) notified that he or she may decline to receive medical services by telemedicine and may withdraw from such care at any time.    Notes:      [facility] Established Patient Visit    Reason for Visit:  compensated cirrhosis due to SHEPPARD    PCP: Edgar Clements    HPI:  This is a 68 y.o. male with PMH noted below, here for follow up of above    Liver staging history:  - liver biopsy 6/2010 - severe steatohepatitis at 80% w/ stage 1 fibrosis.   - Fibrosure 2014 - cirrhosis, F4   - repeat liver biopsy 7/25/14 - mild steatohepatitis with stage 2-3 out of 4 fibrosis  - Fibroscan 12/2016 - F2/F3, kPa of 10.0  - Fibroscan 12/2018 - kPa 14.6, F4 cirrhosis, CAP = 341, S3     Previous serologic w/u was negative for Jaswant's, alpha-1 antitrypsin deficiency, hemochromatosis, autoimmune etiology - needs Hep B and C screening     Prior serologic workup:   Lab Results   Component Value Date    SMOOTHMUSCAB Negative 01/15/2010    AMAIFA Negative 01/15/2010    IGGSERUM 1,493 01/15/2010    FERRITIN 293 01/15/2010    FESATURATED 46 01/15/2010    [id number] MM 01/15/2010     [id number] 107 01/15/2010    CERULOPLSM 25.8 01/15/2010       Risk factors for NAFLD include obesity, HTN, HLD    Interval HPI: Presents today alone via video visit.   No s/s of hepatic decompensation: no ascites, HE or h/o variceal bleeding  Due for EGD/colon    Abd U/S done 11/2023 showed no liver lesions, + splenomegaly    Lab Results   Component Value Date    ALT 57 (H) 12/01/2023    AST 35 12/01/2023    ALKPHOS 57 12/01/2023    BILITOT 1.5 (H) 12/01/2023    ALBUMIN 4.1 12/01/2023    INR 1.1 12/01/2023    PLT 84 (L) 12/01/2023       MELD 3.0: 10 at 12/1/2023  7:55 AM  MELD-Na: 10 at 12/1/2023  7:55 AM  Calculated from:  Serum Creatinine: 1.1 mg/dL at 12/1/2023  7:55 AM  Serum Sodium: 145 mmol/L (Using max of 137 mmol/L) at 12/1/2023  7:55 AM  Total Bilirubin: 1.5 mg/dL at 12/1/2023  7:55 AM  Serum Albumin: 4.1 g/dL (Using max of 3.5 g/dL) at 12/1/2023  7:55 AM  INR(ratio): 1.1 at 12/1/2023  7:55 AM  Age at listing (hypothetical): 68 years  Sex: Male at 12/1/2023  7:55 AM  MELD 10    Cirrhosis Health Maintenance:   -- Last EGD : none, due soon, overdue, triage phone call scheduled   -- Due for next colonoscopy : overdue, triage phone call scheduled   -- HCC screening   U/S  no lesions, next due 6/2024   AFP  WNL, next due 6/2024  Lab Results   Component Value Date    AFP <2.0 12/01/2023       -- Immunity to Hep A and B - will check with next labs     Denies family history of liver disease . Denies current alcohol consumption  Social History     Substance and Sexual Activity   Alcohol Use No    Comment: none since ~2005         PMHX:  has a past medical history of Cirrhosis of liver without ascites (12/19/2016), HLD (hyperlipidemia), Hypertension, ITP (idiopathic thrombocytopenic purpura), and SHEPPARD (nonalcoholic steatohepatitis).    PSHX:  has a past surgical history that includes Bone marrow biopsy (8/6/10); Liver biopsy (6/11/10); and Liver biopsy (7/2014).    The patient's social and family histories were  reviewed by me and updated in the appropriate section of the electronic medical record.    Review of patient's allergies indicates:   Allergen Reactions    Niacin preparations      Flushed, increased HR       Current Outpatient Medications on File Prior to Visit   Medication Sig Dispense Refill    ALPRAZolam (XANAX) 0.5 MG tablet       amLODIPine (NORVASC) 5 MG tablet amlodipine 5 mg tablet      atenolol (TENORMIN) 50 MG tablet Take 1 tablet by mouth Daily.      atorvastatin (LIPITOR) 10 MG tablet atorvastatin 10 mg tablet   1 po daily      colchicine (MITIGARE) 0.6 mg Cap colchicine 0.6 mg capsule   TAKE TWO CAPSULES BY MOUTH NOW THEN ONE CAP IN ONE HOUR      losartan (COZAAR) 100 MG tablet Take 100 mg by mouth once daily.      multivit-min-ferrous fumarate (MULTI VITAMIN) 9 mg iron/15 mL Liqd Multi Vitamin   1 po daily      omega-3 acid ethyl esters (LOVAZA) 1 gram capsule Take 2 capsules by mouth Daily.      vitamin E 400 UNIT capsule Take 800 Units by mouth once daily.       No current facility-administered medications on file prior to visit.         ROS:   GENERAL: Denies fatigue  CARDIOVASCULAR: Denies edema  GI: Denies abdominal pain  SKIN: Denies rash, itching   NEURO: Denies confusion, memory loss, or mood changes    Objective Findings:    PHYSICAL EXAM:   Friendly  male, in no acute distress; alert and oriented to person, place and time  VITALS: There were no vitals taken for this visit.  EYES: Sclerae anicteric  GI: Soft, non-tender, non-distended. No ascites.  EXTREMITIES:  No edema.  SKIN: Warm and dry. No jaundice. No telangectasias noted. No palmar erythema.  NEURO:  No asterixis.  PSYCH:  Thought and speech pattern appropriate. Behavior normal        EDUCATION:  See instructions discussed with patient in Instructions section of the After Visit Summary       ASSESSMENT & PLAN:  68 y.o. White male with:  1.  Cirrhosis, due to SHEPPARD, well compensated  -- MELD 3.0: 10 at 12/1/2023  7:55 AM  MELD-Na: 10 at  12/1/2023  7:55 AM  Calculated from:  Serum Creatinine: 1.1 mg/dL at 12/1/2023  7:55 AM  Serum Sodium: 145 mmol/L (Using max of 137 mmol/L) at 12/1/2023  7:55 AM  Total Bilirubin: 1.5 mg/dL at 12/1/2023  7:55 AM  Serum Albumin: 4.1 g/dL (Using max of 3.5 g/dL) at 12/1/2023  7:55 AM  INR(ratio): 1.1 at 12/1/2023  7:55 AM  Age at listing (hypothetical): 68 years  Sex: Male at 12/1/2023  7:55 AM    -- HCC screening: AFP and abd. U/S.. U/S showed no lesions, AFP WNL - both next due 6/2024  -- Immunity to Hep A and B - see HPI  --- Serological workup was in HPI  -- Cirrhosis counseling as noted above and discussed with patient     2.  Fatty liver  -- risk factors for fatty liver: obesity, HTN, HLD  Recommend:  1. Weight loss goal of 20 lbs  2. Low carb/sugar, high fiber and protein diet  3. Exercise, 5 days per week, 30 minutes per day, as tolerated  4. Recommend good cholesterol, blood pressure, blood sugar levels     3. Portal hypertension   -- EGD due, triage phone call scheduled  -- No Ascites or ankle edema   -- Splenomegaly and thrombocytopenia    4. Colon cancer screening  Scheduled with EGD          Follow up in about 6 months (around 6/6/2024). with US and lab before  Orders Placed This Encounter   Procedures    US Abdomen Limited    Hepatitis A antibody, IgG    Hepatitis B Core Antibody, Total    Hepatitis B Surface Ab, Qualitative    Hepatitis B Surface Antigen    Hepatitis C Antibody    AFP Tumor Marker    CBC Without Differential    Comprehensive Metabolic Panel    Protime-INR    Ambulatory referral/consult to Endo Procedure         Thank you for allowing me to participate in the care of ARGENTINA Blanco    I spent a total of 30 minutes on the day of the visit.This includes face to face time and non-face to face time preparing to see the patient (eg, review of tests), obtaining and/or reviewing separately obtained history, documenting clinical information in the electronic or  other health record, independently interpreting results and communicating results to the patient/family/caregiver, and coordinating care.       CC'ed note to:

## 2024-02-05 ENCOUNTER — PATIENT MESSAGE (OUTPATIENT)
Dept: HEPATOLOGY | Facility: CLINIC | Age: 69
End: 2024-02-05
Payer: MEDICARE

## 2024-03-07 ENCOUNTER — TELEPHONE (OUTPATIENT)
Dept: ENDOSCOPY | Facility: HOSPITAL | Age: 69
End: 2024-03-07
Payer: MEDICARE

## 2024-03-07 ENCOUNTER — PATIENT MESSAGE (OUTPATIENT)
Dept: ENDOSCOPY | Facility: HOSPITAL | Age: 69
End: 2024-03-07
Payer: MEDICARE

## 2024-03-07 NOTE — TELEPHONE ENCOUNTER
Contacted the patient to schedule an endoscopy procedure(s) EGD and Colonoscopy. The patient did not answer the call and left a voice message requesting a call back.

## 2024-03-11 ENCOUNTER — PATIENT MESSAGE (OUTPATIENT)
Dept: ENDOSCOPY | Facility: HOSPITAL | Age: 69
End: 2024-03-11
Payer: MEDICARE

## 2024-03-11 ENCOUNTER — TELEPHONE (OUTPATIENT)
Dept: ENDOSCOPY | Facility: HOSPITAL | Age: 69
End: 2024-03-11
Payer: MEDICARE

## 2024-05-28 ENCOUNTER — HOSPITAL ENCOUNTER (OUTPATIENT)
Dept: RADIOLOGY | Facility: HOSPITAL | Age: 69
Discharge: HOME | End: 2024-05-28
Attending: NURSE PRACTITIONER
Payer: MEDICARE

## 2024-05-28 DIAGNOSIS — K75.81 LIVER CIRRHOSIS SECONDARY TO NASH: ICD-10-CM

## 2024-05-28 DIAGNOSIS — K74.60 LIVER CIRRHOSIS SECONDARY TO NASH: ICD-10-CM

## 2024-05-28 PROCEDURE — 76705 ECHO EXAM OF ABDOMEN: CPT | Mod: TC

## 2024-05-28 PROCEDURE — 76705 ECHO EXAM OF ABDOMEN: CPT | Mod: 26,,, | Performed by: RADIOLOGY

## 2024-05-31 ENCOUNTER — OFFICE VISIT (OUTPATIENT)
Dept: HEPATOLOGY | Facility: CLINIC | Age: 69
End: 2024-05-31
Payer: MEDICARE

## 2024-05-31 ENCOUNTER — PATIENT MESSAGE (OUTPATIENT)
Dept: HEPATOLOGY | Facility: CLINIC | Age: 69
End: 2024-05-31

## 2024-05-31 VITALS — WEIGHT: 265 LBS | HEIGHT: 76 IN | BODY MASS INDEX: 32.27 KG/M2

## 2024-05-31 DIAGNOSIS — K75.81 LIVER CIRRHOSIS SECONDARY TO NASH: Primary | ICD-10-CM

## 2024-05-31 DIAGNOSIS — K76.6 PORTAL HYPERTENSION: ICD-10-CM

## 2024-05-31 DIAGNOSIS — E66.09 CLASS 1 OBESITY DUE TO EXCESS CALORIES WITH SERIOUS COMORBIDITY AND BODY MASS INDEX (BMI) OF 32.0 TO 32.9 IN ADULT: ICD-10-CM

## 2024-05-31 DIAGNOSIS — E78.5 HYPERLIPIDEMIA, UNSPECIFIED HYPERLIPIDEMIA TYPE: ICD-10-CM

## 2024-05-31 DIAGNOSIS — I10 PRIMARY HYPERTENSION: ICD-10-CM

## 2024-05-31 DIAGNOSIS — K74.60 LIVER CIRRHOSIS SECONDARY TO NASH: Primary | ICD-10-CM

## 2024-05-31 DIAGNOSIS — Z23 NEED FOR HEPATITIS A AND B VACCINATION: ICD-10-CM

## 2024-05-31 PROCEDURE — 1160F RVW MEDS BY RX/DR IN RCRD: CPT | Mod: CPTII,95,, | Performed by: NURSE PRACTITIONER

## 2024-05-31 PROCEDURE — 3008F BODY MASS INDEX DOCD: CPT | Mod: CPTII,95,, | Performed by: NURSE PRACTITIONER

## 2024-05-31 PROCEDURE — 99214 OFFICE O/P EST MOD 30 MIN: CPT | Mod: 95,,, | Performed by: NURSE PRACTITIONER

## 2024-05-31 PROCEDURE — 4010F ACE/ARB THERAPY RXD/TAKEN: CPT | Mod: CPTII,95,, | Performed by: NURSE PRACTITIONER

## 2024-05-31 PROCEDURE — 1159F MED LIST DOCD IN RCRD: CPT | Mod: CPTII,95,, | Performed by: NURSE PRACTITIONER

## 2024-05-31 RX ORDER — VALSARTAN 320 MG/1
1 TABLET ORAL DAILY
COMMUNITY

## 2024-05-31 RX ORDER — TAMSULOSIN HYDROCHLORIDE 0.4 MG/1
1 CAPSULE ORAL NIGHTLY
COMMUNITY

## 2024-05-31 NOTE — PROGRESS NOTES
The patient location is: [place]  The chief complaint leading to consultation is: see below    Visit type: audiovisual    Face to Face time with patient: 30 minutes of total time spent on the encounter, which includes face to face time and non-face to face time preparing to see the patient (eg, review of tests), Obtaining and/or reviewing separately obtained history, Documenting clinical information in the electronic or other health record, Independently interpreting results (not separately reported) and communicating results to the patient/family/caregiver, or Care coordination (not separately reported).         Each patient to whom he or she provides medical services by telemedicine is:  (1) informed of the relationship between the physician and patient and the respective role of any other health care provider with respect to management of the patient; and (2) notified that he or she may decline to receive medical services by telemedicine and may withdraw from such care at any time.    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 male with PMH noted below, here for follow up of above    Liver staging history:  - liver biopsy 6/2010 - severe steatohepatitis at 80% w/ stage 1 fibrosis.   - Fibrosure 2014 - cirrhosis, F4   - repeat liver biopsy 7/25/14 - mild steatohepatitis with stage 2-3 out of 4 fibrosis  - Fibroscan 12/2016 - F2/F3, kPa of 10.0  - Fibroscan 12/2018 - kPa 14.6, F4 cirrhosis, CAP = 341, S3     Previous serologic w/u was negative for Jaswant's, alpha-1 antitrypsin deficiency, hemochromatosis, autoimmune etiology and Hep B and C    Prior serologic workup:   Lab Results   Component Value Date    SMOOTHMUSCAB Negative 01/15/2010    AMAIFA Negative 01/15/2010    IGGSERUM 1,493 01/15/2010    FERRITIN 293 01/15/2010    FESATURATED 46 01/15/2010    [id number] MM 01/15/2010    [id number]  107 01/15/2010    CERULOPLSM 25.8 01/15/2010    HEPBSAG Non-reactive 05/28/2024    HEPCAB Non-reactive 05/28/2024       Risk factors for NAFLD include obesity, HTN, HLD    Interval HPI: Presents today alone via video visit.   No s/s of hepatic decompensation: no ascites, HE or h/o variceal bleeding  Due for EGD/colon, pt plans to schedule     Abd U/S done 5/2024 showed hepatomegaly, fatty liver,  no liver lesions, + splenomegaly (12.7cm)    Lab Results   Component Value Date    ALT 64 (H) 05/28/2024    AST 42 (H) 05/28/2024    ALKPHOS 61 05/28/2024    BILITOT 1.3 (H) 05/28/2024    ALBUMIN 4.2 05/28/2024    INR 1.0 05/28/2024    PLT 94 (L) 05/28/2024       MELD 3.0: 10 at 5/28/2024  8:15 AM  MELD-Na: 10 at 5/28/2024  8:15 AM  Calculated from:  Serum Creatinine: 1.3 mg/dL at 5/28/2024  8:15 AM  Serum Sodium: 141 mmol/L (Using max of 137 mmol/L) at 5/28/2024  8:15 AM  Total Bilirubin: 1.3 mg/dL at 5/28/2024  8:15 AM  Serum Albumin: 4.2 g/dL (Using max of 3.5 g/dL) at 5/28/2024  8:15 AM  INR(ratio): 1.0 at 5/28/2024  8:15 AM  Age at listing (hypothetical): 69 years  Sex: Male at 5/28/2024  8:15 AM  MELD 10    Cirrhosis Health Maintenance:   -- Last EGD : none, overdue, triage phone call scheduled   -- Due for next colonoscopy : overdue, triage phone call scheduled   -- HCC screening   U/S  no lesions, next due 11/2024   AFP  WNL, next due 11/2024  Lab Results   Component Value Date    AFP <2.0 05/28/2024       -- Immunity to Hep A and B - needs twinrix, sent to [facility] pharm and ID     Denies family history of liver disease . Denies current alcohol consumption  Social History     Substance and Sexual Activity   Alcohol Use No    Comment: none since ~2005         PMHX:  has a past medical history of Cirrhosis of liver without ascites (12/19/2016), HLD (hyperlipidemia), Hypertension, ITP (idiopathic thrombocytopenic purpura), and SHEPPARD (nonalcoholic steatohepatitis).    PSHX:  has a past surgical history that includes Bone  "marrow biopsy (8/6/10); Liver biopsy (6/11/10); and Liver biopsy (7/2014).    The patient's social and family histories were reviewed by me and updated in the appropriate section of the electronic medical record.    Review of patient's allergies indicates:   Allergen Reactions    Niacin preparations      Flushed, increased HR       Current Outpatient Medications on File Prior to Visit   Medication Sig Dispense Refill    ALPRAZolam (XANAX) 0.5 MG tablet       amLODIPine (NORVASC) 5 MG tablet amlodipine 5 mg tablet      atenolol (TENORMIN) 50 MG tablet Take 1 tablet by mouth Daily.      atorvastatin (LIPITOR) 10 MG tablet atorvastatin 10 mg tablet   1 po daily      colchicine (MITIGARE) 0.6 mg Cap colchicine 0.6 mg capsule   TAKE TWO CAPSULES BY MOUTH NOW THEN ONE CAP IN ONE HOUR      cyanocobalamin 1,000 mcg/mL injection Inject 1,000 mcg into the muscle once a week.      losartan (COZAAR) 100 MG tablet Take 100 mg by mouth once daily.      multivit-min-ferrous fumarate (MULTI VITAMIN) 9 mg iron/15 mL Liqd Multi Vitamin   1 po daily      omega-3 acid ethyl esters (LOVAZA) 1 gram capsule Take 2 capsules by mouth Daily.      vitamin E 400 UNIT capsule Take 800 Units by mouth once daily.       No current facility-administered medications on file prior to visit.         ROS:   GENERAL: Denies fatigue  CARDIOVASCULAR: Denies edema  GI: Denies abdominal pain  SKIN: Denies rash, itching   NEURO: Denies confusion, memory loss, or mood changes    Objective Findings:    PHYSICAL EXAM:   Friendly  male, in no acute distress; alert and oriented to person, place and time  VITALS: Ht 6' 4" (1.93 m)   Wt 120.2 kg (265 lb)   BMI 32.26 kg/m²   EYES: Sclerae anicteric  GI: Soft, non-tender, non-distended. No ascites.  EXTREMITIES:  No edema.  SKIN: Warm and dry. No jaundice. No telangectasias noted. No palmar erythema.  NEURO:  No asterixis.  PSYCH:  Thought and speech pattern appropriate. Behavior normal        EDUCATION:  See " instructions discussed with patient in Instructions section of the After Visit Summary       ASSESSMENT & PLAN:  69 y.o. White male with:  1.  Cirrhosis, due to SHEPPARD, well compensated  -- MELD 3.0: 10 at 5/28/2024  8:15 AM  MELD-Na: 10 at 5/28/2024  8:15 AM  Calculated from:  Serum Creatinine: 1.3 mg/dL at 5/28/2024  8:15 AM  Serum Sodium: 141 mmol/L (Using max of 137 mmol/L) at 5/28/2024  8:15 AM  Total Bilirubin: 1.3 mg/dL at 5/28/2024  8:15 AM  Serum Albumin: 4.2 g/dL (Using max of 3.5 g/dL) at 5/28/2024  8:15 AM  INR(ratio): 1.0 at 5/28/2024  8:15 AM  Age at listing (hypothetical): 69 years  Sex: Male at 5/28/2024  8:15 AM    -- HCC screening: AFP and abd. U/S.. U/S showed no lesions, AFP WNL - both next due 11/2024  -- Immunity to Hep A and B - see HPI  --- Serological workup was in HPI  -- Cirrhosis counseling as noted above and discussed with patient     2.  Fatty liver  -- risk factors for fatty liver: obesity, HTN, HLD  Recommend:  1. Weight loss goal of 20 lbs  2. Low carb/sugar, high fiber and protein diet  3. Exercise, 5 days per week, 30 minutes per day, as tolerated  4. Recommend good cholesterol, blood pressure, blood sugar levels     3. Portal hypertension   -- EGD due, triage phone call scheduled  -- No Ascites or ankle edema   -- Splenomegaly and thrombocytopenia    4. Colon cancer screening  Scheduled with EGD          Follow up in about 6 months (around 11/30/2024). with US and lab before  Orders Placed This Encounter   Procedures    US Abdomen Limited    Hepatitis A / Hepatitis B Combined Vaccine (IM)    AFP Tumor Marker    CBC Without Differential    Comprehensive Metabolic Panel    Protime-INR    Ambulatory referral/consult to Endo Procedure         Thank you for allowing me to participate in the care of ARGENTINA Blanco    I spent a total of 30 minutes on the day of the visit.This includes face to face time and non-face to face time preparing to see the  patient (eg, review of tests), obtaining and/or reviewing separately obtained history, documenting clinical information in the electronic or other health record, independently interpreting results and communicating results to the patient/family/caregiver, and coordinating care.       CC'ed note to:

## 2024-10-22 ENCOUNTER — TELEPHONE (OUTPATIENT)
Dept: HEPATOLOGY | Facility: CLINIC | Age: 69
End: 2024-10-22
Payer: MEDICARE

## 2024-10-22 DIAGNOSIS — K74.60 LIVER CIRRHOSIS SECONDARY TO NASH: Primary | ICD-10-CM

## 2024-10-22 DIAGNOSIS — K75.81 LIVER CIRRHOSIS SECONDARY TO NASH: Primary | ICD-10-CM

## 2024-10-22 NOTE — TELEPHONE ENCOUNTER
Please call pt and notify him that I will be out of the office on November 8th. Please give him the following options for his follow up    I can message him the results of his testing in [place]  We can call him about the results after   3. Reschedule video visit for a future date @ 330 pm     Let me know what he decides    Also, can you schedule his follow up video visit appt in 6 months with labs and US a few days before?    Thanks !

## 2024-10-22 NOTE — TELEPHONE ENCOUNTER
Reached out to pt in regards to r/s appt due to provider being out of the office. Pt did not answer, left vm for pt to give the office a call back.  Sending pt portal message as well

## 2024-10-24 ENCOUNTER — PATIENT MESSAGE (OUTPATIENT)
Dept: HEPATOLOGY | Facility: CLINIC | Age: 69
End: 2024-10-24
Payer: MEDICARE

## 2024-11-07 ENCOUNTER — HOSPITAL ENCOUNTER (OUTPATIENT)
Dept: RADIOLOGY | Facility: HOSPITAL | Age: 69
Discharge: HOME | End: 2024-11-07
Attending: NURSE PRACTITIONER
Payer: MEDICARE

## 2024-11-07 DIAGNOSIS — K74.60 LIVER CIRRHOSIS SECONDARY TO NASH: ICD-10-CM

## 2024-11-07 DIAGNOSIS — K75.81 LIVER CIRRHOSIS SECONDARY TO NASH: ICD-10-CM

## 2024-11-07 PROCEDURE — 76705 ECHO EXAM OF ABDOMEN: CPT | Mod: 26,,, | Performed by: RADIOLOGY

## 2024-11-07 PROCEDURE — 76705 ECHO EXAM OF ABDOMEN: CPT | Mod: TC

## 2024-11-12 ENCOUNTER — PATIENT MESSAGE (OUTPATIENT)
Dept: HEPATOLOGY | Facility: CLINIC | Age: 69
End: 2024-11-12

## 2024-11-12 ENCOUNTER — OFFICE VISIT (OUTPATIENT)
Dept: HEPATOLOGY | Facility: CLINIC | Age: 69
End: 2024-11-12
Payer: MEDICARE

## 2024-11-12 DIAGNOSIS — E66.811 CLASS 1 OBESITY DUE TO EXCESS CALORIES WITH SERIOUS COMORBIDITY AND BODY MASS INDEX (BMI) OF 32.0 TO 32.9 IN ADULT: ICD-10-CM

## 2024-11-12 DIAGNOSIS — F40.240 CLAUSTROPHOBIA: Primary | ICD-10-CM

## 2024-11-12 DIAGNOSIS — I10 PRIMARY HYPERTENSION: ICD-10-CM

## 2024-11-12 DIAGNOSIS — K76.9 LIVER LESION: ICD-10-CM

## 2024-11-12 DIAGNOSIS — K75.81 LIVER CIRRHOSIS SECONDARY TO NASH: Primary | ICD-10-CM

## 2024-11-12 DIAGNOSIS — E78.5 HYPERLIPIDEMIA, UNSPECIFIED HYPERLIPIDEMIA TYPE: ICD-10-CM

## 2024-11-12 DIAGNOSIS — E66.09 CLASS 1 OBESITY DUE TO EXCESS CALORIES WITH SERIOUS COMORBIDITY AND BODY MASS INDEX (BMI) OF 32.0 TO 32.9 IN ADULT: ICD-10-CM

## 2024-11-12 DIAGNOSIS — K74.60 LIVER CIRRHOSIS SECONDARY TO NASH: Primary | ICD-10-CM

## 2024-11-12 DIAGNOSIS — K76.6 PORTAL HYPERTENSION: ICD-10-CM

## 2024-11-12 DIAGNOSIS — K76.9 LIVER DISEASE, UNSPECIFIED: ICD-10-CM

## 2024-11-12 PROCEDURE — 1159F MED LIST DOCD IN RCRD: CPT | Mod: CPTII,95,, | Performed by: NURSE PRACTITIONER

## 2024-11-12 PROCEDURE — 4010F ACE/ARB THERAPY RXD/TAKEN: CPT | Mod: CPTII,95,, | Performed by: NURSE PRACTITIONER

## 2024-11-12 PROCEDURE — 1160F RVW MEDS BY RX/DR IN RCRD: CPT | Mod: CPTII,95,, | Performed by: NURSE PRACTITIONER

## 2024-11-12 PROCEDURE — 99214 OFFICE O/P EST MOD 30 MIN: CPT | Mod: 95,,, | Performed by: NURSE PRACTITIONER

## 2024-11-12 NOTE — PROGRESS NOTES
The patient location is: [place]  The chief complaint leading to consultation is: see below    Visit type: audiovisual    Face to Face time with patient: 30 minutes of total time spent on the encounter, which includes face to face time and non-face to face time preparing to see the patient (eg, review of tests), Obtaining and/or reviewing separately obtained history, Documenting clinical information in the electronic or other health record, Independently interpreting results (not separately reported) and communicating results to the patient/family/caregiver, or Care coordination (not separately reported).         Each patient to whom he or she provides medical services by telemedicine is:  (1) informed of the relationship between the physician and patient and the respective role of any other health care provider with respect to management of the patient; and (2) notified that he or she may decline to receive medical services by telemedicine and may withdraw from such care at any time.    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 male with PMH noted below, here for follow up of above    Liver staging history:  - liver biopsy 6/2010 - severe steatohepatitis at 80% w/ stage 1 fibrosis.   - Fibrosure 2014 - cirrhosis, F4   - repeat liver biopsy 7/25/14 - mild steatohepatitis with stage 2-3 out of 4 fibrosis  - Fibroscan 12/2016 - F2/F3, kPa of 10.0  - Fibroscan 12/2018 - kPa 14.6, F4 cirrhosis, CAP = 341, S3     Previous serologic w/u was negative for Jaswant's, alpha-1 antitrypsin deficiency, hemochromatosis, autoimmune etiology and Hep B and C    Prior serologic workup:   Lab Results   Component Value Date    SMOOTHMUSCAB Negative 01/15/2010    AMAIFA Negative 01/15/2010    IGGSERUM 1,493 01/15/2010    FERRITIN 293 01/15/2010    FESATURATED 46 01/15/2010    [id number] MM 01/15/2010    [id number]  107 01/15/2010    CERULOPLSM 25.8 01/15/2010    HEPBSAG Non-reactive 05/28/2024    HEPCAB Non-reactive 05/28/2024     Risk factors for NAFLD include obesity, HTN, HLD    Interval HPI: Presents today alone via video visit.   No s/s of hepatic decompensation: no ascites, HE or h/o variceal bleeding  Due for EGD/colon, previously placed order and triage phone call but pt was not ready to schedule but pt now ready to schedule, wishes to find GI closer to home     Abd U/S done 11/2024 showed hepatomegaly, fatty liver, possible liver lesion versus focal fatty sparing, + splenomegaly       Lab Results   Component Value Date    ALT 72 (H) 11/07/2024    AST 50 (H) 11/07/2024    ALKPHOS 57 11/07/2024    BILITOT 1.1 (H) 11/07/2024    ALBUMIN 4.1 11/07/2024    INR 1.1 11/07/2024    PLT 95 (L) 11/07/2024       MELD 3.0: 10 at 11/7/2024  7:40 AM  MELD-Na: 10 at 11/7/2024  7:40 AM  Calculated from:  Serum Creatinine: 1.3 mg/dL at 11/7/2024  7:40 AM  Serum Sodium: 143 mmol/L (Using max of 137 mmol/L) at 11/7/2024  7:40 AM  Total Bilirubin: 1.1 mg/dL at 11/7/2024  7:40 AM  Serum Albumin: 4.1 g/dL (Using max of 3.5 g/dL) at 11/7/2024  7:40 AM  INR(ratio): 1.1 at 11/7/2024  7:40 AM  Age at listing (hypothetical): 69 years  Sex: Male at 11/7/2024  7:40 AM  MELD 10    Cirrhosis Health Maintenance:   -- Last EGD : none, overdue, discussed importance. Pt going to call Dr. Dunlap's office for an appt and let me know if he wishes to schedule with [email or web address] instead   -- Due for next colonoscopy : overdue, discussed importance. Pt going to call Dr. Dunlap's office for an appt and let me know if he wishes to schedule with [facility] instead   -- HCC screening   U/S  no lesions, next due 11/2024   AFP  WNL, next due 11/2024  Lab Results   Component Value Date    AFP <2.0 11/07/2024       -- Immunity to Hep A and B - needs twinrix, previously sent to [facility] pharm and ID     Denies family history of liver disease . Denies current alcohol  consumption  Social History     Substance and Sexual Activity   Alcohol Use No    Comment: none since ~2005         PMHX:  has a past medical history of Cirrhosis of liver without ascites (12/19/2016), HLD (hyperlipidemia), Hypertension, ITP (idiopathic thrombocytopenic purpura), and SHEPPARD (nonalcoholic steatohepatitis).    PSHX:  has a past surgical history that includes Bone marrow biopsy (8/6/10); Liver biopsy (6/11/10); and Liver biopsy (7/2014).    The patient's social and family histories were reviewed by me and updated in the appropriate section of the electronic medical record.    Review of patient's allergies indicates:   Allergen Reactions    Niacin preparations      Flushed, increased HR       Current Outpatient Medications on File Prior to Visit   Medication Sig Dispense Refill    ALPRAZolam (XANAX) 0.5 MG tablet       amLODIPine (NORVASC) 5 MG tablet amlodipine 5 mg tablet      atenolol (TENORMIN) 50 MG tablet Take 1 tablet by mouth Daily.      atorvastatin (LIPITOR) 10 MG tablet atorvastatin 10 mg tablet   1 po daily      colchicine (MITIGARE) 0.6 mg Cap colchicine 0.6 mg capsule   TAKE TWO CAPSULES BY MOUTH NOW THEN ONE CAP IN ONE HOUR      cyanocobalamin 1,000 mcg/mL injection Inject 1,000 mcg into the muscle once a week.      multivit-min-ferrous fumarate (MULTI VITAMIN) 9 mg iron/15 mL Liqd Multi Vitamin   1 po daily      omega-3 acid ethyl esters (LOVAZA) 1 gram capsule Take 2 capsules by mouth Daily.      tamsulosin (FLOMAX) 0.4 mg Cap Take 1 capsule by mouth every evening. take at bedtime      valsartan (DIOVAN) 320 MG tablet Take 1 tablet by mouth once daily.      vitamin E 400 UNIT capsule Take 800 Units by mouth once daily.       No current facility-administered medications on file prior to visit.         ROS:   GENERAL: Denies fatigue  CARDIOVASCULAR: Denies edema  GI: Denies abdominal pain  SKIN: Denies rash, itching   NEURO: Denies confusion, memory loss, or mood changes    Objective  Findings:    PHYSICAL EXAM:   Friendly  male, in no acute distress; alert and oriented to person, place and time  VITALS: There were no vitals taken for this visit.  EYES: Sclerae anicteric  GI: Soft, non-tender, non-distended. No ascites.  EXTREMITIES:  No edema.  SKIN: Warm and dry. No jaundice. No telangectasias noted. No palmar erythema.  NEURO:  No asterixis.  PSYCH:  Thought and speech pattern appropriate. Behavior normal        EDUCATION:  See instructions discussed with patient in Instructions section of the After Visit Summary       ASSESSMENT & PLAN:  69 y.o. White male with:  1.  Cirrhosis, due to SHEPPARD, well compensated  -- MELD 3.0: 10 at 11/7/2024  7:40 AM  MELD-Na: 10 at 11/7/2024  7:40 AM  Calculated from:  Serum Creatinine: 1.3 mg/dL at 11/7/2024  7:40 AM  Serum Sodium: 143 mmol/L (Using max of 137 mmol/L) at 11/7/2024  7:40 AM  Total Bilirubin: 1.1 mg/dL at 11/7/2024  7:40 AM  Serum Albumin: 4.1 g/dL (Using max of 3.5 g/dL) at 11/7/2024  7:40 AM  INR(ratio): 1.1 at 11/7/2024  7:40 AM  Age at listing (hypothetical): 69 years  Sex: Male at 11/7/2024  7:40 AM    -- HCC screening: AFP and abd. U/S.. see below   -- Immunity to Hep A and B - see HPI  --- Serological workup was in HPI  -- Cirrhosis counseling as noted above and discussed with patient     2.  Focal fatty sparing versus liver lesion on US  MRI soon  AFP WNL    3. Metabolic associated steatotic liver disease   -- risk factors for fatty liver: obesity, HTN, HLD  Recommend:  1. Weight loss goal of 20 lbs  2. Low carb/sugar, high fiber and protein diet  3. Exercise, 5 days per week, 30 minutes per day, as tolerated  4. Recommend good cholesterol, blood pressure, blood sugar levels   5. Cannot use Rezdiffra with cirrhosis     4. Portal hypertension   -- EGD due, see HPI  -- No Ascites or ankle edema   -- Splenomegaly and thrombocytopenia    5. Colon cancer screening  See HPI          Follow up for pending results of workup. Based on MRI results    Orders Placed This Encounter   Procedures    MRI Abdomen W WO Contrast        Thank you for allowing me to participate in the care of Danny Amaya NP-C    I spent a total of 30 minutes on the day of the visit.This includes face to face time and non-face to face time preparing to see the patient (eg, review of tests), obtaining and/or reviewing separately obtained history, documenting clinical information in the electronic or other health record, independently interpreting results and communicating results to the patient/family/caregiver, and coordinating care.       CC'ed note to:

## 2024-11-13 RX ORDER — DIAZEPAM 5 MG/1
5 TABLET ORAL ONCE
Qty: 2 TABLET | Refills: 0 | Status: SHIPPED | OUTPATIENT
Start: 2024-11-13 | End: 2024-11-13

## 2024-11-18 ENCOUNTER — HOSPITAL ENCOUNTER (OUTPATIENT)
Dept: RADIOLOGY | Facility: HOSPITAL | Age: 69
Discharge: HOME | End: 2024-11-18
Attending: NURSE PRACTITIONER
Payer: MEDICARE

## 2024-11-18 DIAGNOSIS — K76.9 LIVER DISEASE, UNSPECIFIED: ICD-10-CM

## 2024-11-18 DIAGNOSIS — K74.60 LIVER CIRRHOSIS SECONDARY TO NASH: ICD-10-CM

## 2024-11-18 DIAGNOSIS — K76.9 LIVER LESION: ICD-10-CM

## 2024-11-18 DIAGNOSIS — K75.81 LIVER CIRRHOSIS SECONDARY TO NASH: ICD-10-CM

## 2024-11-18 PROCEDURE — 74183 MRI ABD W/O CNTR FLWD CNTR: CPT | Mod: 26,,, | Performed by: RADIOLOGY

## 2024-11-18 PROCEDURE — 74183 MRI ABD W/O CNTR FLWD CNTR: CPT | Mod: TC

## 2024-11-18 PROCEDURE — A9585 GADOBUTROL INJECTION: HCPCS | Performed by: NURSE PRACTITIONER

## 2024-11-18 PROCEDURE — 25500020 PHARM REV CODE 255: Performed by: NURSE PRACTITIONER

## 2024-11-18 RX ORDER — GADOBUTROL 604.72 MG/ML
10 INJECTION INTRAVENOUS
Status: COMPLETED | OUTPATIENT
Start: 2024-11-18 | End: 2024-11-18

## 2024-11-18 RX ADMIN — GADOBUTROL 10 ML: 604.72 INJECTION INTRAVENOUS at 12:11

## 2024-11-19 ENCOUNTER — TELEPHONE (OUTPATIENT)
Dept: HEPATOLOGY | Facility: CLINIC | Age: 69
End: 2024-11-19
Payer: MEDICARE

## 2024-11-19 DIAGNOSIS — K75.81 LIVER CIRRHOSIS SECONDARY TO NASH: Primary | ICD-10-CM

## 2024-11-19 DIAGNOSIS — K74.60 LIVER CIRRHOSIS SECONDARY TO NASH: Primary | ICD-10-CM

## 2024-11-19 NOTE — TELEPHONE ENCOUNTER
MRI reassuring, no liver lesions, sent explanation to pt in [facility]    Please contact pt to schedule f/u with me in 6 months with labs and US before    Thanks!

## 2024-12-26 ENCOUNTER — PATIENT MESSAGE (OUTPATIENT)
Dept: HEPATOLOGY | Facility: CLINIC | Age: 69
End: 2024-12-26
Payer: MEDICARE

## 2025-05-13 ENCOUNTER — HOSPITAL ENCOUNTER (OUTPATIENT)
Dept: RADIOLOGY | Facility: HOSPITAL | Age: 70
Discharge: HOME | End: 2025-05-13
Attending: NURSE PRACTITIONER
Payer: MEDICARE

## 2025-05-13 ENCOUNTER — RESULTS FOLLOW-UP (OUTPATIENT)
Dept: HEPATOLOGY | Facility: CLINIC | Age: 70
End: 2025-05-13

## 2025-05-13 DIAGNOSIS — K74.60 LIVER CIRRHOSIS SECONDARY TO NASH: ICD-10-CM

## 2025-05-13 DIAGNOSIS — K75.81 LIVER CIRRHOSIS SECONDARY TO NASH: ICD-10-CM

## 2025-05-13 PROCEDURE — 76705 ECHO EXAM OF ABDOMEN: CPT | Mod: 26,,, | Performed by: RADIOLOGY

## 2025-05-13 PROCEDURE — 76705 ECHO EXAM OF ABDOMEN: CPT | Mod: TC

## 2025-05-15 ENCOUNTER — RESULTS FOLLOW-UP (OUTPATIENT)
Dept: HEPATOLOGY | Facility: CLINIC | Age: 70
End: 2025-05-15

## 2025-05-20 ENCOUNTER — OFFICE VISIT (OUTPATIENT)
Dept: HEPATOLOGY | Facility: CLINIC | Age: 70
End: 2025-05-20
Payer: MEDICARE

## 2025-05-20 VITALS — HEIGHT: 76 IN | BODY MASS INDEX: 34.7 KG/M2 | WEIGHT: 285 LBS

## 2025-05-20 DIAGNOSIS — I10 PRIMARY HYPERTENSION: ICD-10-CM

## 2025-05-20 DIAGNOSIS — K76.6 PORTAL HYPERTENSION: ICD-10-CM

## 2025-05-20 DIAGNOSIS — K74.60 LIVER CIRRHOSIS SECONDARY TO NASH: Primary | ICD-10-CM

## 2025-05-20 DIAGNOSIS — E78.2 MIXED HYPERLIPIDEMIA: ICD-10-CM

## 2025-05-20 DIAGNOSIS — K75.81 LIVER CIRRHOSIS SECONDARY TO NASH: Primary | ICD-10-CM

## 2025-05-20 DIAGNOSIS — E66.811 CLASS 1 OBESITY DUE TO EXCESS CALORIES WITH SERIOUS COMORBIDITY AND BODY MASS INDEX (BMI) OF 34.0 TO 34.9 IN ADULT: ICD-10-CM

## 2025-05-20 DIAGNOSIS — E66.09 CLASS 1 OBESITY DUE TO EXCESS CALORIES WITH SERIOUS COMORBIDITY AND BODY MASS INDEX (BMI) OF 34.0 TO 34.9 IN ADULT: ICD-10-CM

## 2025-05-20 DIAGNOSIS — R73.03 PRE-DIABETES: ICD-10-CM

## 2025-05-20 PROBLEM — K76.9 LIVER LESION: Status: RESOLVED | Noted: 2024-11-12 | Resolved: 2025-05-20

## 2025-05-20 PROCEDURE — 1159F MED LIST DOCD IN RCRD: CPT | Mod: CPTII,95,, | Performed by: NURSE PRACTITIONER

## 2025-05-20 PROCEDURE — 4010F ACE/ARB THERAPY RXD/TAKEN: CPT | Mod: CPTII,95,, | Performed by: NURSE PRACTITIONER

## 2025-05-20 PROCEDURE — 3008F BODY MASS INDEX DOCD: CPT | Mod: CPTII,95,, | Performed by: NURSE PRACTITIONER

## 2025-05-20 PROCEDURE — 1160F RVW MEDS BY RX/DR IN RCRD: CPT | Mod: CPTII,95,, | Performed by: NURSE PRACTITIONER

## 2025-05-20 PROCEDURE — 98006 SYNCH AUDIO-VIDEO EST MOD 30: CPT | Mod: 95,,, | Performed by: NURSE PRACTITIONER

## 2025-05-20 RX ORDER — NAPROXEN SODIUM 220 MG/1
81 TABLET, FILM COATED ORAL DAILY
COMMUNITY

## 2025-05-20 NOTE — PROGRESS NOTES
The patient location is: [place]  The chief complaint leading to consultation is: see below    Visit type: audiovisual    Face to Face time with patient: 30 minutes of total time spent on the encounter, which includes face to face time and non-face to face time preparing to see the patient (eg, review of tests), Obtaining and/or reviewing separately obtained history, Documenting clinical information in the electronic or other health record, Independently interpreting results (not separately reported) and communicating results to the patient/family/caregiver, or Care coordination (not separately reported).         Each patient to whom he or she provides medical services by telemedicine is:  (1) informed of the relationship between the physician and patient and the respective role of any other health care provider with respect to management of the patient; and (2) notified that he or she may decline to receive medical services by telemedicine and may withdraw from such care at any time.    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 male with PMH noted below, here for follow up of above    Of note: previously saw hematology years ago for thrombcytopenia    Liver staging history:  - liver biopsy 6/2010 - severe steatohepatitis at 80% w/ stage 1 fibrosis.   - Fibrosure 2014 - cirrhosis, F4   - repeat liver biopsy 7/25/14 - mild steatohepatitis with stage 2-3 out of 4 fibrosis  - Fibroscan 12/2016 - F2/F3, kPa of 10.0  - Fibroscan 12/2018 - kPa 14.6, F4 cirrhosis, CAP = 341, S3     Previous serologic w/u was negative for Jaswant's, alpha-1 antitrypsin deficiency, hemochromatosis, autoimmune etiology and Hep B and C    Prior serologic workup:   Lab Results   Component Value Date    SMOOTHMUSCAB Negative 01/15/2010    AMAIFA Negative 01/15/2010    IGGSERUM 1,493 01/15/2010    FERRITIN 293 01/15/2010     FESATURATED 46 01/15/2010    QRGPW3THFQFS MM 01/15/2010    AAHLJ1CTHMYI 107 01/15/2010    CERULOPLSM 25.8 01/15/2010    HEPBSAG Non-reactive 05/28/2024    HEPCAB Non-reactive 05/28/2024     Risk factors for NAFLD include obesity, HTN, HLD, pre diabetes    Interval HPI: Presents today alone via video visit.   No SSB   Current 285 lbs   No s/s of hepatic decompensation: no ascites, HE or h/o variceal bleeding  Due for EGD/colon, previously placed order and triage phone call but pt was not ready to schedule but pt now ready to schedule, wishes to find GI closer to home, reminded pt, will contact Dr. Dunlap this week     Abd U/S done 5/2025 showed hepatomegaly, fatty liver, no liver lesions, no splenomegaly       Lab Results   Component Value Date    ALT 58 (H) 05/13/2025    AST 39 05/13/2025    ALKPHOS 57 05/13/2025    BILITOT 1.3 (H) 05/13/2025    ALBUMIN 4.0 05/13/2025    INR 1.1 05/13/2025    PLT 98 (L) 05/13/2025       MELD 3.0: 9 at 5/13/2025  9:34 AM  MELD-Na: 9 at 5/13/2025  9:34 AM  Calculated from:  Serum Creatinine: 1.1 mg/dL at 5/13/2025  9:34 AM  Serum Sodium: 142 mmol/L (Using max of 137 mmol/L) at 5/13/2025  9:34 AM  Total Bilirubin: 1.3 mg/dL at 5/13/2025  9:34 AM  Serum Albumin: 4 g/dL (Using max of 3.5 g/dL) at 5/13/2025  9:34 AM  INR(ratio): 1.1 at 5/13/2025  9:34 AM  Age at listing (hypothetical): 70 years  Sex: Male at 5/13/2025  9:34 AM  MELD 9    Cirrhosis Health Maintenance:   -- Last EGD : none, overdue, discussed importance. Pt going to call Dr. Dunlap's office for an appt and let me know if he wishes to schedule with [facility] instead   -- Due for next colonoscopy : overdue, discussed importance. Pt going to call Dr. Dunlap's office for an appt and let me know if he wishes to schedule with [facility] instead   -- HCC screening   U/S  no lesions, next due 11/2025   AFP  WNL, next due 11/2025      -- Immunity to Hep A and B - previously given vaccine instructions     Denies family history  of liver disease . Denies current alcohol consumption  Social History     Substance and Sexual Activity   Alcohol Use No    Comment: none since ~2005         PMHX:  has a past medical history of Cirrhosis of liver without ascites (12/19/2016), HLD (hyperlipidemia), Hypertension, ITP (idiopathic thrombocytopenic purpura), and SHEPPARD (nonalcoholic steatohepatitis).    PSHX:  has a past surgical history that includes Bone marrow biopsy (8/6/10); Liver biopsy (6/11/10); and Liver biopsy (7/2014).    The patient's social and family histories were reviewed by me and updated in the appropriate section of the electronic medical record.    Review of patient's allergies indicates:   Allergen Reactions    Niacin preparations      Flushed, increased HR       Current Outpatient Medications on File Prior to Visit   Medication Sig Dispense Refill    ALPRAZolam (XANAX) 0.5 MG tablet       amLODIPine (NORVASC) 5 MG tablet amlodipine 5 mg tablet      atenolol (TENORMIN) 50 MG tablet Take 1 tablet by mouth Daily.      atorvastatin (LIPITOR) 10 MG tablet atorvastatin 10 mg tablet   1 po daily      colchicine (MITIGARE) 0.6 mg Cap colchicine 0.6 mg capsule   TAKE TWO CAPSULES BY MOUTH NOW THEN ONE CAP IN ONE HOUR      cyanocobalamin 1,000 mcg/mL injection Inject 1,000 mcg into the muscle once a week.      diazePAM (VALIUM) 5 MG tablet Take 1 tablet (5 mg total) by mouth once. Take 1 tablet 1 hour before MRI procedure. Can take an additional dose immediately before MRI if needed. Do not drive after taking for 1 dose 2 tablet 0    multivit-min-ferrous fumarate (MULTI VITAMIN) 9 mg iron/15 mL Liqd Multi Vitamin   1 po daily      omega-3 acid ethyl esters (LOVAZA) 1 gram capsule Take 2 capsules by mouth Daily.      tamsulosin (FLOMAX) 0.4 mg Cap Take 1 capsule by mouth every evening. take at bedtime      valsartan (DIOVAN) 320 MG tablet Take 1 tablet by mouth once daily.      vitamin E 400 UNIT capsule Take 800 Units by mouth once daily.    "    No current facility-administered medications on file prior to visit.         ROS:   GENERAL: Denies fatigue  CARDIOVASCULAR: Denies edema  GI: Denies abdominal pain  SKIN: Denies rash, itching   NEURO: Denies confusion, memory loss, or mood changes    Objective Findings:    PHYSICAL EXAM:   Friendly  male, in no acute distress; alert and oriented to person, place and time  VITALS: Ht 6' 4" (1.93 m)   Wt 129.3 kg (285 lb)   BMI 34.69 kg/m²   EYES: Sclerae anicteric  GI: Soft, non-tender, non-distended. No ascites.  EXTREMITIES:  No edema.  SKIN: Warm and dry. No jaundice. No telangectasias noted. No palmar erythema.  NEURO:  No asterixis.  PSYCH:  Thought and speech pattern appropriate. Behavior normal        EDUCATION:  See instructions discussed with patient in Instructions section of the After Visit Summary       ASSESSMENT & PLAN:  70 y.o. White male with:  1.  Cirrhosis, due to SHEPPARD, well compensated  Cirrhosis based on fibroscan noting cirrhosis and thrombcytopenia/previous splenomegaly, although no splenomegaly on recent US  Needs EGD for portal HTN assessment  Cannot use Rezdiffra with cirrhosis concerns currently. Would not benefit from repeating fibrosis staging at this time given thrombocytopenia  -- MELD 3.0: 9 at 5/13/2025  9:34 AM  MELD-Na: 9 at 5/13/2025  9:34 AM  Calculated from:  Serum Creatinine: 1.1 mg/dL at 5/13/2025  9:34 AM  Serum Sodium: 142 mmol/L (Using max of 137 mmol/L) at 5/13/2025  9:34 AM  Total Bilirubin: 1.3 mg/dL at 5/13/2025  9:34 AM  Serum Albumin: 4 g/dL (Using max of 3.5 g/dL) at 5/13/2025  9:34 AM  INR(ratio): 1.1 at 5/13/2025  9:34 AM  Age at listing (hypothetical): 70 years  Sex: Male at 5/13/2025  9:34 AM    -- HCC screening: AFP and abd. U/S.. Us without lesions, AFP WNL, next both due 11/2025  --- Serological workup was in HPI  -- Cirrhosis counseling as noted above and discussed with patient     2. Metabolic associated steatotic liver disease   -- risk factors for " fatty liver: obesity, HTN, HLD, preDM  Recommend:  1. Weight loss goal of 20 lbs  2. Low carb/sugar, high fiber and protein diet  3. Exercise, 5 days per week, 30 minutes per day, as tolerated  4. Recommend good cholesterol, blood pressure, blood sugar levels   5. Cannot use Rezdiffra with cirrhosis concerns     3. Portal hypertension (?)  -- EGD due, see HPI  -- No Ascites or ankle edema   -- previously Splenomegaly and thrombocytopenia, but spleen normal on recent US, needs EGD soon. Pt wishes to do with Dr. Dunlap, he will call to schedule             Follow up in about 6 months (around 11/20/2025). With US and labs 1 week before  Orders Placed This Encounter   Procedures    US Abdomen Limited    CBC Without Differential    Comprehensive Metabolic Panel    Protime-INR    Alpha-Fetoprotein and AFP L-3    Enhanced Liver Fibrosis (ELF)        Thank you for allowing me to participate in the care of TRISH BlancoC    I spent a total of 30 minutes on the day of the visit.This includes face to face time and non-face to face time preparing to see the patient (eg, review of tests), obtaining and/or reviewing separately obtained history, documenting clinical information in the electronic or other health record, independently interpreting results and communicating results to the patient/family/caregiver, and coordinating care.       CC'ed note to:

## 2025-05-20 NOTE — Clinical Note
Issa Dunlap: Would you be able to arrange an EGD with you for variceal screening ?Thanks in advance!

## 2025-05-20 NOTE — PATIENT INSTRUCTIONS
FATTY LIVER   These things are important to improve fatty liver:  No alcohol consumption  2 Weight loss goal of 20 lbs. ok to use weight loss medications to help with weight loss from a liver standpoint if you don't have any other contraindications   3.. Avoid processed foods. No fried/fast foods. No sugary drinks or drinks with any calories.   5. Low carb/sugar and high protein diet (120 grams per day of protein).Try to limit your carb intake to LESS than  grams per day total. Use MyFitness Pal or Lose It samm to add up your carbs through the day. Do NOT drink any beverages with calories or carbs (this can lead to high blood sugar and weight gain). The main thing to focus on is high protein, low carb)  6. Exercise, 5 days per week, 30 minutes per day, as tolerated  7. Recommend good cholesterol, blood pressure, blood sugar levels   8. There is a new medication called Rezdiffra for the treatment of F2-F3 liver scarring due to fatty liver. It is only indicated for patients with significant scar tissue from fatty liver (cannot use Rezdiffra)    In some people, fatty liver can progress to steatohepatitis (inflamatory fatty liver) and possibly to cirrhosis, increasing the risk for liver cancer, liver failure, and death. Therefore, the lifestyle changes are very important to decrease this risk.     Helpful website for MAS/fatty liver: https://mash.Xinguodu/patient-resources/      HEP A/B VACCINE  The CDC recommends that all adults complete the combination Hepatitis A and B vaccine called TwinRix. This will protect your liver from these viruses, which can make your liver very sick.     Hep A can be transmitted through food and water and can cause significant liver injury. There was previously a significant Hepatitis A outbreak in [place]. Hep B vaccine is transmitted through blood or bodily fluids (there are no symptoms typically) and can develop longstanding (chronic) Hep B and there is no cure, so many people  have it for life. Therefore, the vaccines provide immunity against these viruses that can cause harm to the liver.     The vaccine series is 3 vaccines: one now, one at 4 weeks and one 6 months after the 1st one. You can get it from any pharmacy but any [facility] pharmacy typically stocks it and administers it frequently      If the vaccine is not covered at the pharmacy level with your insurance, you may be able to get it with your PCP or in the infectious disease department at [facility] or from your [facility] clinic.        CIRRHOSIS  This is a web site that you may find helpful about cirrhosis : https://cirrhosiscare.ca/    Because you have cirrhosis, it is important to attend clinic visits every 6 months with an Ultrasound and blood tests every 6 months to screen for liver cancer (you are at risk of developing liver cancer due to scar tissue in the liver)    Signs and symptoms of worsening liver disease include jaundice, fluid in the belly (ascites), and confusion/disorientation/slowed thought processes due to hepatic encephalopathy (toxins building up because of liver problems).   You should seek medical attention if any of these things occur.    Also, possible bleeding from esophageal varices (blood vessels in the stomach and foodpipe can burst and cause fatal bleeding).  Therefore, if you have symptoms of vomiting blood, blood in your stool, dark or black stools or vomiting coffee ground vomit, YOU SHOULD GO TO THE EMERGENCY ROOM IMMEDIATELY.     Cirrhosis can increase the risk of liver cancer, liver failure, and death. However, we will watch your liver function score (MELD score) closely with each clinic visit. A normal MELD score is 6, highest is 40. Your last one was an 9. We will check this with every clinic visit. A MELD 15 or higher is when we start to consider transplant because MELD 15 or higher indicates that the liver is not functioning as well     Cirrhosis Counseling  - NO alcohol use (includes  beer, wine, and/or liquor)  - avoid non-steroidal anti-inflammatory drugs (NSAIDs) such as ibuprofen, Motrin, naprosyn, Alleve due to the risk of kidney damage  - can take acetaminophen (Tylenol), no more than 2000 mg per day  - low sodium (salt) 2 gram per day diet  - high protein diet: 120 grams per day to prevent muscle mass loss. Drink at least 1 protein shake daily (Premier Protein is best option because it is very high protein and low sugar). Ok to use this as nighttime snack to fit it in   - resistance exercises for muscle strength  - avoid raw seafoods due to the risk of fatal Vibrio vulnificus infection  - ultrasound of the liver every 6 months for liver cancer screening (you are at risk of developing liver cancer due to scar tissue in the liver)  - Upper endoscopy every 1-2 years to screen for varices in the stomach and foodpipe which can burst and cause fatal bleeding

## 2025-06-24 ENCOUNTER — PATIENT MESSAGE (OUTPATIENT)
Dept: HEPATOLOGY | Facility: CLINIC | Age: 70
End: 2025-06-24
Payer: MEDICARE

## 2025-06-24 NOTE — TELEPHONE ENCOUNTER
I called urology.  Dr. Felix Pa does procedure.  He office is at the [facility].

## 2025-06-26 ENCOUNTER — PATIENT MESSAGE (OUTPATIENT)
Dept: HEPATOLOGY | Facility: CLINIC | Age: 70
End: 2025-06-26
Payer: MEDICARE